# Patient Record
Sex: MALE | NOT HISPANIC OR LATINO | URBAN - METROPOLITAN AREA
[De-identification: names, ages, dates, MRNs, and addresses within clinical notes are randomized per-mention and may not be internally consistent; named-entity substitution may affect disease eponyms.]

---

## 2017-08-01 ENCOUNTER — CHARTING TRANS (OUTPATIENT)
Dept: NEPHROLOGY | Age: 72
End: 2017-08-01

## 2017-08-08 ENCOUNTER — CHARTING TRANS (OUTPATIENT)
Dept: NEPHROLOGY | Age: 72
End: 2017-08-08

## 2018-11-28 VITALS
WEIGHT: 243 LBS | OXYGEN SATURATION: 98 % | HEIGHT: 73 IN | SYSTOLIC BLOOD PRESSURE: 141 MMHG | HEART RATE: 71 BPM | DIASTOLIC BLOOD PRESSURE: 90 MMHG | BODY MASS INDEX: 32.2 KG/M2 | RESPIRATION RATE: 20 BRPM | TEMPERATURE: 97.8 F

## 2018-11-28 VITALS
OXYGEN SATURATION: 95 % | WEIGHT: 243 LBS | SYSTOLIC BLOOD PRESSURE: 140 MMHG | HEART RATE: 62 BPM | DIASTOLIC BLOOD PRESSURE: 73 MMHG | TEMPERATURE: 97.9 F | RESPIRATION RATE: 18 BRPM

## 2019-01-01 ENCOUNTER — HOSPITAL ENCOUNTER (INPATIENT)
Facility: HOSPITAL | Age: 74
LOS: 2 days | Discharge: HOME | DRG: 682 | End: 2019-01-01
Attending: EMERGENCY MEDICINE | Admitting: STUDENT IN AN ORGANIZED HEALTH CARE EDUCATION/TRAINING PROGRAM
Payer: MEDICARE

## 2019-01-01 ENCOUNTER — APPOINTMENT (OUTPATIENT)
Dept: GENERAL RADIOLOGY | Facility: HOSPITAL | Age: 74
DRG: 314 | End: 2019-01-01
Attending: EMERGENCY MEDICINE
Payer: MEDICARE

## 2019-01-01 ENCOUNTER — HOSPITAL ENCOUNTER (OUTPATIENT)
Facility: HOSPITAL | Age: 74
Setting detail: OBSERVATION
Discharge: HOME | End: 2019-01-01
Attending: EMERGENCY MEDICINE | Admitting: HOSPITALIST
Payer: MEDICARE

## 2019-01-01 ENCOUNTER — APPOINTMENT (OUTPATIENT)
Dept: CV DIAGNOSTICS | Facility: HOSPITAL | Age: 74
DRG: 314 | End: 2019-01-01
Attending: HOSPITALIST
Payer: MEDICARE

## 2019-01-01 ENCOUNTER — APPOINTMENT (OUTPATIENT)
Dept: GENERAL RADIOLOGY | Facility: HOSPITAL | Age: 74
DRG: 314 | End: 2019-01-01
Attending: NURSE PRACTITIONER
Payer: MEDICARE

## 2019-01-01 ENCOUNTER — APPOINTMENT (OUTPATIENT)
Dept: CT IMAGING | Facility: HOSPITAL | Age: 74
End: 2019-01-01
Attending: HOSPITALIST
Payer: MEDICARE

## 2019-01-01 ENCOUNTER — HOSPITAL ENCOUNTER (INPATIENT)
Dept: INTERVENTIONAL RADIOLOGY/VASCULAR | Facility: HOSPITAL | Age: 74
LOS: 2 days | Discharge: NURSING, REHAB OR LONG-TERM CARE | DRG: 260 | End: 2020-01-01
Attending: INTERNAL MEDICINE | Admitting: INTERNAL MEDICINE
Payer: MEDICARE

## 2019-01-01 ENCOUNTER — APPOINTMENT (OUTPATIENT)
Dept: MRI IMAGING | Facility: HOSPITAL | Age: 74
DRG: 314 | End: 2019-01-01
Attending: Other
Payer: MEDICARE

## 2019-01-01 ENCOUNTER — APPOINTMENT (OUTPATIENT)
Dept: GENERAL RADIOLOGY | Facility: HOSPITAL | Age: 74
DRG: 260 | End: 2019-01-01
Attending: INTERNAL MEDICINE
Payer: MEDICARE

## 2019-01-01 ENCOUNTER — APPOINTMENT (OUTPATIENT)
Dept: CT IMAGING | Facility: HOSPITAL | Age: 74
DRG: 314 | End: 2019-01-01
Attending: INTERNAL MEDICINE
Payer: MEDICARE

## 2019-01-01 ENCOUNTER — PATIENT OUTREACH (OUTPATIENT)
Dept: CASE MANAGEMENT | Age: 74
End: 2019-01-01

## 2019-01-01 ENCOUNTER — HOSPITAL ENCOUNTER (INPATIENT)
Facility: HOSPITAL | Age: 74
LOS: 4 days | Discharge: HOME | DRG: 291 | End: 2019-01-01
Attending: EMERGENCY MEDICINE | Admitting: HOSPITALIST
Payer: MEDICARE

## 2019-01-01 ENCOUNTER — HOSPITAL ENCOUNTER (EMERGENCY)
Facility: HOSPITAL | Age: 74
Discharge: HOME | End: 2019-01-01
Attending: EMERGENCY MEDICINE
Payer: MEDICARE

## 2019-01-01 ENCOUNTER — APPOINTMENT (OUTPATIENT)
Dept: GENERAL RADIOLOGY | Facility: HOSPITAL | Age: 74
End: 2019-01-01
Attending: EMERGENCY MEDICINE
Payer: MEDICARE

## 2019-01-01 ENCOUNTER — APPOINTMENT (OUTPATIENT)
Dept: MRI IMAGING | Facility: HOSPITAL | Age: 74
DRG: 314 | End: 2019-01-01
Attending: NURSE PRACTITIONER
Payer: MEDICARE

## 2019-01-01 ENCOUNTER — APPOINTMENT (OUTPATIENT)
Dept: CT IMAGING | Facility: HOSPITAL | Age: 74
DRG: 314 | End: 2019-01-01
Attending: HOSPITALIST
Payer: MEDICARE

## 2019-01-01 ENCOUNTER — APPOINTMENT (OUTPATIENT)
Dept: ULTRASOUND IMAGING | Facility: HOSPITAL | Age: 74
End: 2019-01-01
Attending: EMERGENCY MEDICINE
Payer: MEDICARE

## 2019-01-01 ENCOUNTER — ANESTHESIA (OUTPATIENT)
Dept: INTERVENTIONAL RADIOLOGY/VASCULAR | Facility: HOSPITAL | Age: 74
DRG: 260 | End: 2019-01-01
Payer: MEDICARE

## 2019-01-01 ENCOUNTER — APPOINTMENT (OUTPATIENT)
Dept: CT IMAGING | Facility: HOSPITAL | Age: 74
DRG: 314 | End: 2019-01-01
Attending: Other
Payer: MEDICARE

## 2019-01-01 ENCOUNTER — APPOINTMENT (OUTPATIENT)
Dept: GENERAL RADIOLOGY | Facility: HOSPITAL | Age: 74
DRG: 291 | End: 2019-01-01
Payer: MEDICARE

## 2019-01-01 ENCOUNTER — HOSPITAL ENCOUNTER (INPATIENT)
Facility: HOSPITAL | Age: 74
LOS: 6 days | Discharge: NURSING, REHAB OR LONG-TERM CARE | DRG: 314 | End: 2019-01-01
Attending: EMERGENCY MEDICINE | Admitting: HOSPITALIST
Payer: MEDICARE

## 2019-01-01 ENCOUNTER — APPOINTMENT (OUTPATIENT)
Dept: CT IMAGING | Facility: HOSPITAL | Age: 74
End: 2019-01-01
Attending: EMERGENCY MEDICINE
Payer: MEDICARE

## 2019-01-01 ENCOUNTER — APPOINTMENT (OUTPATIENT)
Dept: CV DIAGNOSTICS | Facility: HOSPITAL | Age: 74
DRG: 682 | End: 2019-01-01
Attending: INTERNAL MEDICINE
Payer: MEDICARE

## 2019-01-01 ENCOUNTER — APPOINTMENT (OUTPATIENT)
Dept: CV DIAGNOSTICS | Facility: HOSPITAL | Age: 74
DRG: 314 | End: 2019-01-01
Attending: NURSE PRACTITIONER
Payer: MEDICARE

## 2019-01-01 VITALS
DIASTOLIC BLOOD PRESSURE: 55 MMHG | WEIGHT: 235.88 LBS | HEIGHT: 73 IN | SYSTOLIC BLOOD PRESSURE: 94 MMHG | HEART RATE: 68 BPM | OXYGEN SATURATION: 97 % | BODY MASS INDEX: 31.26 KG/M2 | RESPIRATION RATE: 18 BRPM | TEMPERATURE: 98 F

## 2019-01-01 VITALS
TEMPERATURE: 98 F | DIASTOLIC BLOOD PRESSURE: 76 MMHG | BODY MASS INDEX: 31.47 KG/M2 | OXYGEN SATURATION: 97 % | HEIGHT: 73 IN | WEIGHT: 237.44 LBS | RESPIRATION RATE: 17 BRPM | HEART RATE: 67 BPM | SYSTOLIC BLOOD PRESSURE: 121 MMHG

## 2019-01-01 VITALS
TEMPERATURE: 98 F | HEIGHT: 73 IN | RESPIRATION RATE: 18 BRPM | HEART RATE: 60 BPM | WEIGHT: 262 LBS | OXYGEN SATURATION: 95 % | DIASTOLIC BLOOD PRESSURE: 74 MMHG | BODY MASS INDEX: 34.72 KG/M2 | SYSTOLIC BLOOD PRESSURE: 97 MMHG

## 2019-01-01 VITALS
DIASTOLIC BLOOD PRESSURE: 77 MMHG | TEMPERATURE: 98 F | HEIGHT: 73 IN | RESPIRATION RATE: 17 BRPM | OXYGEN SATURATION: 92 % | BODY MASS INDEX: 32.84 KG/M2 | WEIGHT: 247.81 LBS | SYSTOLIC BLOOD PRESSURE: 119 MMHG | HEART RATE: 65 BPM

## 2019-01-01 VITALS
BODY MASS INDEX: 35.12 KG/M2 | HEIGHT: 73 IN | RESPIRATION RATE: 21 BRPM | SYSTOLIC BLOOD PRESSURE: 138 MMHG | TEMPERATURE: 98 F | OXYGEN SATURATION: 94 % | HEART RATE: 91 BPM | DIASTOLIC BLOOD PRESSURE: 97 MMHG | WEIGHT: 265 LBS

## 2019-01-01 DIAGNOSIS — E87.5 HYPERKALEMIA: ICD-10-CM

## 2019-01-01 DIAGNOSIS — N18.9 ACUTE RENAL FAILURE SUPERIMPOSED ON CHRONIC KIDNEY DISEASE, UNSPECIFIED CKD STAGE, UNSPECIFIED ACUTE RENAL FAILURE TYPE (HCC): ICD-10-CM

## 2019-01-01 DIAGNOSIS — M54.2 NECK PAIN: Primary | ICD-10-CM

## 2019-01-01 DIAGNOSIS — I50.9 ACUTE ON CHRONIC CONGESTIVE HEART FAILURE, UNSPECIFIED HEART FAILURE TYPE (HCC): Primary | ICD-10-CM

## 2019-01-01 DIAGNOSIS — E86.0 DEHYDRATION: ICD-10-CM

## 2019-01-01 DIAGNOSIS — I48.91 ATRIAL FIBRILLATION, UNSPECIFIED TYPE (HCC): ICD-10-CM

## 2019-01-01 DIAGNOSIS — N18.9 ACUTE RENAL FAILURE SUPERIMPOSED ON CHRONIC KIDNEY DISEASE, UNSPECIFIED CKD STAGE, UNSPECIFIED ACUTE RENAL FAILURE TYPE (HCC): Primary | ICD-10-CM

## 2019-01-01 DIAGNOSIS — R77.8 ELEVATED TROPONIN: ICD-10-CM

## 2019-01-01 DIAGNOSIS — I73.9 PERIPHERAL VASCULAR DISEASE OF EXTREMITY (HCC): ICD-10-CM

## 2019-01-01 DIAGNOSIS — Z95.810 ICD (IMPLANTABLE CARDIOVERTER-DEFIBRILLATOR) IN PLACE: ICD-10-CM

## 2019-01-01 DIAGNOSIS — E87.70 HYPERVOLEMIA, UNSPECIFIED HYPERVOLEMIA TYPE: ICD-10-CM

## 2019-01-01 DIAGNOSIS — M79.605 PAIN OF LEFT LOWER EXTREMITY: Primary | ICD-10-CM

## 2019-01-01 DIAGNOSIS — I50.9 ACUTE ON CHRONIC CONGESTIVE HEART FAILURE, UNSPECIFIED HEART FAILURE TYPE (HCC): ICD-10-CM

## 2019-01-01 DIAGNOSIS — N17.9 ACUTE RENAL FAILURE SUPERIMPOSED ON CHRONIC KIDNEY DISEASE, UNSPECIFIED CKD STAGE, UNSPECIFIED ACUTE RENAL FAILURE TYPE (HCC): Primary | ICD-10-CM

## 2019-01-01 DIAGNOSIS — I48.0 PAROXYSMAL ATRIAL FIBRILLATION (HCC): ICD-10-CM

## 2019-01-01 DIAGNOSIS — N28.9 RENAL INSUFFICIENCY: ICD-10-CM

## 2019-01-01 DIAGNOSIS — N17.9 ACUTE RENAL FAILURE SUPERIMPOSED ON CHRONIC KIDNEY DISEASE, UNSPECIFIED CKD STAGE, UNSPECIFIED ACUTE RENAL FAILURE TYPE (HCC): ICD-10-CM

## 2019-01-01 DIAGNOSIS — R53.1 WEAKNESS GENERALIZED: ICD-10-CM

## 2019-01-01 DIAGNOSIS — I25.708 CORONARY ARTERY DISEASE INVOLVING CORONARY BYPASS GRAFT OF NATIVE HEART WITH OTHER FORMS OF ANGINA PECTORIS (HCC): ICD-10-CM

## 2019-01-01 DIAGNOSIS — Z02.9 ENCOUNTERS FOR UNSPECIFIED ADMINISTRATIVE PURPOSE: ICD-10-CM

## 2019-01-01 DIAGNOSIS — R31.0 GROSS HEMATURIA: ICD-10-CM

## 2019-01-01 DIAGNOSIS — R68.89 ALTERATION IN SELF-CARE ABILITY: ICD-10-CM

## 2019-01-01 LAB
ALBUMIN SERPL-MCNC: 3.5 G/DL (ref 3.4–5)
ALBUMIN/GLOB SERPL: 0.9 {RATIO} (ref 1–2)
ALP LIVER SERPL-CCNC: 94 U/L (ref 45–117)
ALT SERPL-CCNC: 18 U/L (ref 16–61)
ANION GAP SERPL CALC-SCNC: 5 MMOL/L (ref 0–18)
ANION GAP SERPL CALC-SCNC: 6 MMOL/L (ref 0–18)
ANION GAP SERPL CALC-SCNC: 6 MMOL/L (ref 0–18)
ANION GAP SERPL CALC-SCNC: 7 MMOL/L (ref 0–18)
ANION GAP SERPL CALC-SCNC: 9 MMOL/L (ref 0–18)
APTT PPP: 39.5 SECONDS (ref 25.4–36.1)
AST SERPL-CCNC: 15 U/L (ref 15–37)
ATRIAL RATE: 500 BPM
ATRIAL RATE: 78 BPM
BASOPHILS # BLD AUTO: 0.03 X10(3) UL (ref 0–0.2)
BASOPHILS NFR BLD AUTO: 0.4 %
BILIRUB SERPL-MCNC: 0.9 MG/DL (ref 0.1–2)
BUN BLD-MCNC: 48 MG/DL (ref 7–18)
BUN BLD-MCNC: 50 MG/DL (ref 7–18)
BUN BLD-MCNC: 50 MG/DL (ref 7–18)
BUN BLD-MCNC: 54 MG/DL (ref 7–18)
BUN BLD-MCNC: 55 MG/DL (ref 7–18)
BUN/CREAT SERPL: 17.8 (ref 10–20)
BUN/CREAT SERPL: 19.4 (ref 10–20)
BUN/CREAT SERPL: 20.1 (ref 10–20)
BUN/CREAT SERPL: 20.1 (ref 10–20)
BUN/CREAT SERPL: 21.2 (ref 10–20)
CALCIUM BLD-MCNC: 8.5 MG/DL (ref 8.5–10.1)
CALCIUM BLD-MCNC: 8.7 MG/DL (ref 8.5–10.1)
CALCIUM BLD-MCNC: 8.8 MG/DL (ref 8.5–10.1)
CALCIUM BLD-MCNC: 8.9 MG/DL (ref 8.5–10.1)
CALCIUM BLD-MCNC: 9 MG/DL (ref 8.5–10.1)
CHLORIDE SERPL-SCNC: 105 MMOL/L (ref 98–112)
CHLORIDE SERPL-SCNC: 106 MMOL/L (ref 98–112)
CHLORIDE SERPL-SCNC: 109 MMOL/L (ref 98–112)
CHLORIDE SERPL-SCNC: 111 MMOL/L (ref 98–112)
CHLORIDE SERPL-SCNC: 111 MMOL/L (ref 98–112)
CO2 SERPL-SCNC: 25 MMOL/L (ref 21–32)
CO2 SERPL-SCNC: 27 MMOL/L (ref 21–32)
CO2 SERPL-SCNC: 28 MMOL/L (ref 21–32)
CO2 SERPL-SCNC: 28 MMOL/L (ref 21–32)
CO2 SERPL-SCNC: 30 MMOL/L (ref 21–32)
CREAT BLD-MCNC: 2.49 MG/DL (ref 0.7–1.3)
CREAT BLD-MCNC: 2.58 MG/DL (ref 0.7–1.3)
CREAT BLD-MCNC: 2.59 MG/DL (ref 0.7–1.3)
CREAT BLD-MCNC: 2.68 MG/DL (ref 0.7–1.3)
CREAT BLD-MCNC: 2.7 MG/DL (ref 0.7–1.3)
DEPRECATED RDW RBC AUTO: 59.5 FL (ref 35.1–46.3)
DEPRECATED RDW RBC AUTO: 59.7 FL (ref 35.1–46.3)
EOSINOPHIL # BLD AUTO: 0.43 X10(3) UL (ref 0–0.7)
EOSINOPHIL NFR BLD AUTO: 5.5 %
ERYTHROCYTE [DISTWIDTH] IN BLOOD BY AUTOMATED COUNT: 17 % (ref 11–15)
ERYTHROCYTE [DISTWIDTH] IN BLOOD BY AUTOMATED COUNT: 17.4 % (ref 11–15)
GLOBULIN PLAS-MCNC: 3.9 G/DL (ref 2.8–4.4)
GLUCOSE BLD-MCNC: 103 MG/DL (ref 70–99)
GLUCOSE BLD-MCNC: 112 MG/DL (ref 70–99)
GLUCOSE BLD-MCNC: 86 MG/DL (ref 70–99)
GLUCOSE BLD-MCNC: 91 MG/DL (ref 70–99)
GLUCOSE BLD-MCNC: 93 MG/DL (ref 70–99)
HAV IGM SER QL: 2.1 MG/DL (ref 1.6–2.6)
HCT VFR BLD AUTO: 37 % (ref 39–53)
HCT VFR BLD AUTO: 39 % (ref 39–53)
HGB BLD-MCNC: 11.4 G/DL (ref 13–17.5)
HGB BLD-MCNC: 12.3 G/DL (ref 13–17.5)
IMM GRANULOCYTES # BLD AUTO: 0.02 X10(3) UL (ref 0–1)
IMM GRANULOCYTES NFR BLD: 0.3 %
INR BLD: 2.1 (ref 0.9–1.1)
INR BLD: 2.17 (ref 0.9–1.1)
INR BLD: 2.24 (ref 0.9–1.1)
INR BLD: 2.46 (ref 0.9–1.1)
INR BLD: 2.48 (ref 0.9–1.1)
LYMPHOCYTES # BLD AUTO: 0.54 X10(3) UL (ref 1–4)
LYMPHOCYTES NFR BLD AUTO: 6.9 %
M PROTEIN MFR SERPL ELPH: 7.4 G/DL (ref 6.4–8.2)
MCH RBC QN AUTO: 29.5 PG (ref 26–34)
MCH RBC QN AUTO: 29.8 PG (ref 26–34)
MCHC RBC AUTO-ENTMCNC: 30.8 G/DL (ref 31–37)
MCHC RBC AUTO-ENTMCNC: 31.5 G/DL (ref 31–37)
MCV RBC AUTO: 94.4 FL (ref 80–100)
MCV RBC AUTO: 95.9 FL (ref 80–100)
MONOCYTES # BLD AUTO: 0.53 X10(3) UL (ref 0.1–1)
MONOCYTES NFR BLD AUTO: 6.8 %
NEUTROPHILS # BLD AUTO: 6.23 X10 (3) UL (ref 1.5–7.7)
NEUTROPHILS # BLD AUTO: 6.23 X10(3) UL (ref 1.5–7.7)
NEUTROPHILS NFR BLD AUTO: 80.1 %
NT-PROBNP SERPL-MCNC: 7469 PG/ML (ref ?–125)
OSMOLALITY SERPL CALC.SUM OF ELEC: 304 MOSM/KG (ref 275–295)
OSMOLALITY SERPL CALC.SUM OF ELEC: 309 MOSM/KG (ref 275–295)
OSMOLALITY SERPL CALC.SUM OF ELEC: 313 MOSM/KG (ref 275–295)
P AXIS: 177 DEGREES
P-R INTERVAL: 198 MS
P-R INTERVAL: 268 MS
PLATELET # BLD AUTO: 142 10(3)UL (ref 150–450)
PLATELET # BLD AUTO: 154 10(3)UL (ref 150–450)
POTASSIUM SERPL-SCNC: 3.8 MMOL/L (ref 3.5–5.1)
POTASSIUM SERPL-SCNC: 3.9 MMOL/L (ref 3.5–5.1)
POTASSIUM SERPL-SCNC: 4.1 MMOL/L (ref 3.5–5.1)
POTASSIUM SERPL-SCNC: 4.1 MMOL/L (ref 3.5–5.1)
POTASSIUM SERPL-SCNC: 4.3 MMOL/L (ref 3.5–5.1)
PSA SERPL DL<=0.01 NG/ML-MCNC: 24.9 SECONDS (ref 12.5–14.7)
PSA SERPL DL<=0.01 NG/ML-MCNC: 25.5 SECONDS (ref 12.5–14.7)
PSA SERPL DL<=0.01 NG/ML-MCNC: 26.2 SECONDS (ref 12.5–14.7)
PSA SERPL DL<=0.01 NG/ML-MCNC: 28.3 SECONDS (ref 12.5–14.7)
PSA SERPL DL<=0.01 NG/ML-MCNC: 28.5 SECONDS (ref 12.5–14.7)
Q-T INTERVAL: 460 MS
Q-T INTERVAL: 488 MS
QRS DURATION: 144 MS
QRS DURATION: 146 MS
QTC CALCULATION (BEZET): 524 MS
QTC CALCULATION (BEZET): 527 MS
R AXIS: -4 DEGREES
R AXIS: -9 DEGREES
RBC # BLD AUTO: 3.86 X10(6)UL (ref 3.8–5.8)
RBC # BLD AUTO: 4.13 X10(6)UL (ref 3.8–5.8)
SODIUM SERPL-SCNC: 140 MMOL/L (ref 136–145)
SODIUM SERPL-SCNC: 142 MMOL/L (ref 136–145)
SODIUM SERPL-SCNC: 143 MMOL/L (ref 136–145)
SODIUM SERPL-SCNC: 143 MMOL/L (ref 136–145)
SODIUM SERPL-SCNC: 145 MMOL/L (ref 136–145)
T AXIS: 148 DEGREES
T AXIS: 165 DEGREES
TROPONIN I SERPL-MCNC: 0.07 NG/ML (ref ?–0.04)
TROPONIN I SERPL-MCNC: 0.07 NG/ML (ref ?–0.04)
VENTRICULAR RATE: 70 BPM
VENTRICULAR RATE: 78 BPM
WBC # BLD AUTO: 6.5 X10(3) UL (ref 4–11)
WBC # BLD AUTO: 7.8 X10(3) UL (ref 4–11)

## 2019-01-01 PROCEDURE — 99223 1ST HOSP IP/OBS HIGH 75: CPT | Performed by: INTERNAL MEDICINE

## 2019-01-01 PROCEDURE — 99291 CRITICAL CARE FIRST HOUR: CPT | Performed by: OTHER

## 2019-01-01 PROCEDURE — 70498 CT ANGIOGRAPHY NECK: CPT | Performed by: OTHER

## 2019-01-01 PROCEDURE — 72148 MRI LUMBAR SPINE W/O DYE: CPT | Performed by: NURSE PRACTITIONER

## 2019-01-01 PROCEDURE — B246ZZ4 ULTRASONOGRAPHY OF RIGHT AND LEFT HEART, TRANSESOPHAGEAL: ICD-10-PCS | Performed by: INTERNAL MEDICINE

## 2019-01-01 PROCEDURE — 73560 X-RAY EXAM OF KNEE 1 OR 2: CPT | Performed by: NURSE PRACTITIONER

## 2019-01-01 PROCEDURE — 93971 EXTREMITY STUDY: CPT | Performed by: EMERGENCY MEDICINE

## 2019-01-01 PROCEDURE — 72146 MRI CHEST SPINE W/O DYE: CPT | Performed by: NURSE PRACTITIONER

## 2019-01-01 PROCEDURE — 70496 CT ANGIOGRAPHY HEAD: CPT | Performed by: OTHER

## 2019-01-01 PROCEDURE — B547ZZA ULTRASONOGRAPHY OF LEFT SUBCLAVIAN VEIN, GUIDANCE: ICD-10-PCS | Performed by: HOSPITALIST

## 2019-01-01 PROCEDURE — 99285 EMERGENCY DEPT VISIT HI MDM: CPT

## 2019-01-01 PROCEDURE — 99232 SBSQ HOSP IP/OBS MODERATE 35: CPT | Performed by: INTERNAL MEDICINE

## 2019-01-01 PROCEDURE — 99233 SBSQ HOSP IP/OBS HIGH 50: CPT | Performed by: HOSPITALIST

## 2019-01-01 PROCEDURE — 99217 OBSERVATION CARE DISCHARGE: CPT | Performed by: HOSPITALIST

## 2019-01-01 PROCEDURE — 99233 SBSQ HOSP IP/OBS HIGH 50: CPT | Performed by: OTHER

## 2019-01-01 PROCEDURE — 99291 CRITICAL CARE FIRST HOUR: CPT | Performed by: HOSPITALIST

## 2019-01-01 PROCEDURE — B24BZZ4 ULTRASONOGRAPHY OF HEART WITH AORTA, TRANSESOPHAGEAL: ICD-10-PCS | Performed by: HOSPITALIST

## 2019-01-01 PROCEDURE — 99232 SBSQ HOSP IP/OBS MODERATE 35: CPT | Performed by: HOSPITALIST

## 2019-01-01 PROCEDURE — 70551 MRI BRAIN STEM W/O DYE: CPT | Performed by: OTHER

## 2019-01-01 PROCEDURE — 74176 CT ABD & PELVIS W/O CONTRAST: CPT | Performed by: INTERNAL MEDICINE

## 2019-01-01 PROCEDURE — 93307 TTE W/O DOPPLER COMPLETE: CPT | Performed by: HOSPITALIST

## 2019-01-01 PROCEDURE — 99284 EMERGENCY DEPT VISIT MOD MDM: CPT

## 2019-01-01 PROCEDURE — 0042T CT STROKE(DAWN BRAIN)CTA BRAIN/CTA NECK+PERF(CPT=70496/70498/0042T): CPT | Performed by: OTHER

## 2019-01-01 PROCEDURE — 99223 1ST HOSP IP/OBS HIGH 75: CPT | Performed by: OTHER

## 2019-01-01 PROCEDURE — 99233 SBSQ HOSP IP/OBS HIGH 50: CPT | Performed by: INTERNAL MEDICINE

## 2019-01-01 PROCEDURE — 72141 MRI NECK SPINE W/O DYE: CPT | Performed by: NURSE PRACTITIONER

## 2019-01-01 PROCEDURE — 70450 CT HEAD/BRAIN W/O DYE: CPT | Performed by: EMERGENCY MEDICINE

## 2019-01-01 PROCEDURE — 0JPT0PZ REMOVAL OF CARDIAC RHYTHM RELATED DEVICE FROM TRUNK SUBCUTANEOUS TISSUE AND FASCIA, OPEN APPROACH: ICD-10-PCS | Performed by: INTERNAL MEDICINE

## 2019-01-01 PROCEDURE — 99223 1ST HOSP IP/OBS HIGH 75: CPT | Performed by: HOSPITALIST

## 2019-01-01 PROCEDURE — 93325 DOPPLER ECHO COLOR FLOW MAPG: CPT | Performed by: NURSE PRACTITIONER

## 2019-01-01 PROCEDURE — 71045 X-RAY EXAM CHEST 1 VIEW: CPT | Performed by: EMERGENCY MEDICINE

## 2019-01-01 PROCEDURE — 99232 SBSQ HOSP IP/OBS MODERATE 35: CPT | Performed by: NURSE PRACTITIONER

## 2019-01-01 PROCEDURE — 71250 CT THORAX DX C-: CPT | Performed by: INTERNAL MEDICINE

## 2019-01-01 PROCEDURE — 99239 HOSP IP/OBS DSCHRG MGMT >30: CPT | Performed by: HOSPITALIST

## 2019-01-01 PROCEDURE — 1111F DSCHRG MED/CURRENT MED MERGE: CPT

## 2019-01-01 PROCEDURE — 93306 TTE W/DOPPLER COMPLETE: CPT | Performed by: INTERNAL MEDICINE

## 2019-01-01 PROCEDURE — 71045 X-RAY EXAM CHEST 1 VIEW: CPT | Performed by: INTERNAL MEDICINE

## 2019-01-01 PROCEDURE — 72125 CT NECK SPINE W/O DYE: CPT | Performed by: EMERGENCY MEDICINE

## 2019-01-01 PROCEDURE — 02HV33Z INSERTION OF INFUSION DEVICE INTO SUPERIOR VENA CAVA, PERCUTANEOUS APPROACH: ICD-10-PCS | Performed by: INTERNAL MEDICINE

## 2019-01-01 PROCEDURE — 70450 CT HEAD/BRAIN W/O DYE: CPT | Performed by: HOSPITALIST

## 2019-01-01 PROCEDURE — 99226 SUBSEQUENT OBSERVATION CARE: CPT | Performed by: HOSPITALIST

## 2019-01-01 PROCEDURE — 5A09457 ASSISTANCE WITH RESPIRATORY VENTILATION, 24-96 CONSECUTIVE HOURS, CONTINUOUS POSITIVE AIRWAY PRESSURE: ICD-10-PCS | Performed by: HOSPITALIST

## 2019-01-01 PROCEDURE — 99220 INITIAL OBSERVATION CARE,LEVL III: CPT | Performed by: HOSPITALIST

## 2019-01-01 PROCEDURE — 93320 DOPPLER ECHO COMPLETE: CPT | Performed by: NURSE PRACTITIONER

## 2019-01-01 PROCEDURE — 99222 1ST HOSP IP/OBS MODERATE 55: CPT | Performed by: NURSE PRACTITIONER

## 2019-01-01 PROCEDURE — 02PA3MZ REMOVAL OF CARDIAC LEAD FROM HEART, PERCUTANEOUS APPROACH: ICD-10-PCS | Performed by: INTERNAL MEDICINE

## 2019-01-01 PROCEDURE — 05H633Z INSERTION OF INFUSION DEVICE INTO LEFT SUBCLAVIAN VEIN, PERCUTANEOUS APPROACH: ICD-10-PCS | Performed by: HOSPITALIST

## 2019-01-01 RX ORDER — ACETAMINOPHEN 325 MG/1
650 TABLET ORAL EVERY 6 HOURS PRN
Status: DISCONTINUED | OUTPATIENT
Start: 2019-01-01 | End: 2019-01-01

## 2019-01-01 RX ORDER — DOCUSATE SODIUM 100 MG/1
100 CAPSULE, LIQUID FILLED ORAL 2 TIMES DAILY
Status: DISCONTINUED | OUTPATIENT
Start: 2019-01-01 | End: 2019-01-01

## 2019-01-01 RX ORDER — ATORVASTATIN CALCIUM 80 MG/1
80 TABLET, FILM COATED ORAL DAILY
Status: DISCONTINUED | OUTPATIENT
Start: 2019-01-01 | End: 2019-01-01

## 2019-01-01 RX ORDER — METOCLOPRAMIDE HYDROCHLORIDE 5 MG/ML
5 INJECTION INTRAMUSCULAR; INTRAVENOUS EVERY 8 HOURS PRN
Status: DISCONTINUED | OUTPATIENT
Start: 2019-01-01 | End: 2019-01-01

## 2019-01-01 RX ORDER — HYDROCODONE BITARTRATE AND ACETAMINOPHEN 5; 325 MG/1; MG/1
1 TABLET ORAL EVERY 4 HOURS PRN
Status: DISCONTINUED | OUTPATIENT
Start: 2019-01-01 | End: 2019-01-01

## 2019-01-01 RX ORDER — FLUTICASONE PROPIONATE 50 MCG
2 SPRAY, SUSPENSION (ML) NASAL DAILY
Status: DISCONTINUED | OUTPATIENT
Start: 2019-01-01 | End: 2019-01-01

## 2019-01-01 RX ORDER — ASPIRIN 81 MG/1
81 TABLET ORAL DAILY
Status: DISCONTINUED | OUTPATIENT
Start: 2019-01-01 | End: 2019-01-01

## 2019-01-01 RX ORDER — FUROSEMIDE 40 MG/1
40 TABLET ORAL
Status: DISCONTINUED | OUTPATIENT
Start: 2019-01-01 | End: 2019-01-01

## 2019-01-01 RX ORDER — MORPHINE SULFATE 4 MG/ML
4 INJECTION, SOLUTION INTRAMUSCULAR; INTRAVENOUS EVERY 2 HOUR PRN
Status: DISCONTINUED | OUTPATIENT
Start: 2019-01-01 | End: 2019-01-01

## 2019-01-01 RX ORDER — HYDROCODONE BITARTRATE AND ACETAMINOPHEN 5; 325 MG/1; MG/1
2 TABLET ORAL EVERY 6 HOURS PRN
Status: DISCONTINUED | OUTPATIENT
Start: 2019-01-01 | End: 2019-01-01

## 2019-01-01 RX ORDER — ISOSORBIDE MONONITRATE 60 MG/1
60 TABLET, EXTENDED RELEASE ORAL DAILY
Status: DISCONTINUED | OUTPATIENT
Start: 2019-01-01 | End: 2019-01-01

## 2019-01-01 RX ORDER — METHYLPREDNISOLONE 4 MG/1
4 TABLET ORAL
Status: DISCONTINUED | OUTPATIENT
Start: 2019-01-01 | End: 2019-01-01

## 2019-01-01 RX ORDER — CEFAZOLIN SODIUM 1 G/3ML
INJECTION, POWDER, FOR SOLUTION INTRAMUSCULAR; INTRAVENOUS AS NEEDED
Status: DISCONTINUED | OUTPATIENT
Start: 2019-01-01 | End: 2019-01-01 | Stop reason: SURG

## 2019-01-01 RX ORDER — BISACODYL 10 MG
10 SUPPOSITORY, RECTAL RECTAL
COMMUNITY

## 2019-01-01 RX ORDER — POLYETHYLENE GLYCOL 3350 17 G/17G
17 POWDER, FOR SOLUTION ORAL DAILY PRN
Status: DISCONTINUED | OUTPATIENT
Start: 2019-01-01 | End: 2019-01-01

## 2019-01-01 RX ORDER — PHENYLEPHRINE HCL IN 0.9% NACL 50MG/250ML
PLASTIC BAG, INJECTION (ML) INTRAVENOUS CONTINUOUS PRN
Status: DISCONTINUED | OUTPATIENT
Start: 2019-01-01 | End: 2019-01-01

## 2019-01-01 RX ORDER — FUROSEMIDE 80 MG
80 TABLET ORAL
Status: DISCONTINUED | OUTPATIENT
Start: 2019-01-01 | End: 2019-01-01

## 2019-01-01 RX ORDER — ALLOPURINOL 100 MG/1
200 TABLET ORAL DAILY
COMMUNITY

## 2019-01-01 RX ORDER — METOPROLOL SUCCINATE 50 MG/1
75 TABLET, EXTENDED RELEASE ORAL
Qty: 60 TABLET | Refills: 0 | Status: ON HOLD | OUTPATIENT
Start: 2019-01-01 | End: 2020-01-01

## 2019-01-01 RX ORDER — MAGNESIUM OXIDE 420 MG
420 TABLET ORAL 2 TIMES DAILY
Status: ON HOLD | COMMUNITY
End: 2019-01-01

## 2019-01-01 RX ORDER — PANTOPRAZOLE SODIUM 20 MG/1
20 TABLET, DELAYED RELEASE ORAL
Status: DISCONTINUED | OUTPATIENT
Start: 2019-01-01 | End: 2019-01-01

## 2019-01-01 RX ORDER — ALLOPURINOL 100 MG/1
100 TABLET ORAL 2 TIMES DAILY
Status: DISCONTINUED | OUTPATIENT
Start: 2019-01-01 | End: 2019-01-01

## 2019-01-01 RX ORDER — FUROSEMIDE 40 MG/1
80 TABLET ORAL 2 TIMES DAILY
Qty: 120 TABLET | Refills: 11 | Status: SHIPPED | OUTPATIENT
Start: 2019-01-01 | End: 2019-01-01

## 2019-01-01 RX ORDER — METHYLPREDNISOLONE 4 MG/1
8 TABLET ORAL
Status: DISCONTINUED | OUTPATIENT
Start: 2019-01-01 | End: 2019-01-01

## 2019-01-01 RX ORDER — PHENYLEPHRINE HCL 10 MG/ML
VIAL (ML) INJECTION AS NEEDED
Status: DISCONTINUED | OUTPATIENT
Start: 2019-01-01 | End: 2019-01-01 | Stop reason: SURG

## 2019-01-01 RX ORDER — NALOXONE HYDROCHLORIDE 0.4 MG/ML
80 INJECTION, SOLUTION INTRAMUSCULAR; INTRAVENOUS; SUBCUTANEOUS AS NEEDED
Status: ACTIVE | OUTPATIENT
Start: 2019-01-01 | End: 2019-01-01

## 2019-01-01 RX ORDER — METOPROLOL SUCCINATE 50 MG/1
50 TABLET, EXTENDED RELEASE ORAL
Status: DISCONTINUED | OUTPATIENT
Start: 2019-01-01 | End: 2019-01-01

## 2019-01-01 RX ORDER — ONDANSETRON 2 MG/ML
4 INJECTION INTRAMUSCULAR; INTRAVENOUS AS NEEDED
Status: ACTIVE | OUTPATIENT
Start: 2019-01-01 | End: 2019-01-01

## 2019-01-01 RX ORDER — CARVEDILOL 12.5 MG/1
12.5 TABLET ORAL 2 TIMES DAILY WITH MEALS
Status: ON HOLD | COMMUNITY
End: 2019-01-01

## 2019-01-01 RX ORDER — WARFARIN SODIUM 2 MG/1
2 TABLET ORAL NIGHTLY
Status: ON HOLD | COMMUNITY
End: 2019-01-01

## 2019-01-01 RX ORDER — HEPARIN SODIUM,PORCINE 180/MG
5000 POWDER (GRAM) MISCELLANEOUS 3 TIMES DAILY
Status: ON HOLD | COMMUNITY
End: 2019-01-01

## 2019-01-01 RX ORDER — METOPROLOL SUCCINATE 25 MG/1
25 TABLET, EXTENDED RELEASE ORAL
Status: DISCONTINUED | OUTPATIENT
Start: 2019-01-01 | End: 2019-01-01

## 2019-01-01 RX ORDER — GABAPENTIN 100 MG/1
100 CAPSULE ORAL 3 TIMES DAILY
Status: DISCONTINUED | OUTPATIENT
Start: 2019-01-01 | End: 2019-01-01

## 2019-01-01 RX ORDER — ACETAMINOPHEN 325 MG/1
650 TABLET ORAL EVERY 4 HOURS PRN
COMMUNITY

## 2019-01-01 RX ORDER — POLYETHYLENE GLYCOL 3350 17 G/17G
17 POWDER, FOR SOLUTION ORAL 2 TIMES DAILY
COMMUNITY
End: 2019-01-01

## 2019-01-01 RX ORDER — ALLOPURINOL 100 MG/1
200 TABLET ORAL DAILY
Status: DISCONTINUED | OUTPATIENT
Start: 2019-01-01 | End: 2020-01-01

## 2019-01-01 RX ORDER — ASPIRIN 325 MG
325 TABLET ORAL DAILY
Status: DISCONTINUED | OUTPATIENT
Start: 2019-01-01 | End: 2019-01-01 | Stop reason: SDUPTHER

## 2019-01-01 RX ORDER — CALCIUM CARBONATE 200(500)MG
1000 TABLET,CHEWABLE ORAL 3 TIMES DAILY PRN
Status: DISCONTINUED | OUTPATIENT
Start: 2019-01-01 | End: 2019-01-01

## 2019-01-01 RX ORDER — METOPROLOL TARTRATE 5 MG/5ML
5 INJECTION INTRAVENOUS EVERY 6 HOURS
Status: DISCONTINUED | OUTPATIENT
Start: 2019-01-01 | End: 2019-01-01

## 2019-01-01 RX ORDER — ALBUTEROL SULFATE 90 UG/1
2 AEROSOL, METERED RESPIRATORY (INHALATION) 2 TIMES DAILY
Status: DISCONTINUED | OUTPATIENT
Start: 2019-01-01 | End: 2019-01-01

## 2019-01-01 RX ORDER — HYDROMORPHONE HYDROCHLORIDE 1 MG/ML
0.5 INJECTION, SOLUTION INTRAMUSCULAR; INTRAVENOUS; SUBCUTANEOUS EVERY 30 MIN PRN
Status: DISCONTINUED | OUTPATIENT
Start: 2019-01-01 | End: 2019-01-01

## 2019-01-01 RX ORDER — METOPROLOL SUCCINATE 25 MG/1
25 TABLET, EXTENDED RELEASE ORAL ONCE
Status: COMPLETED | OUTPATIENT
Start: 2019-01-01 | End: 2019-01-01

## 2019-01-01 RX ORDER — BACITRACIN 50000 [USP'U]/1
INJECTION, POWDER, LYOPHILIZED, FOR SOLUTION INTRAMUSCULAR
Status: COMPLETED
Start: 2019-01-01 | End: 2019-01-01

## 2019-01-01 RX ORDER — TRAMADOL HYDROCHLORIDE 50 MG/1
50 TABLET ORAL 2 TIMES DAILY PRN
Status: DISCONTINUED | OUTPATIENT
Start: 2019-01-01 | End: 2019-01-01

## 2019-01-01 RX ORDER — DOCUSATE SODIUM 100 MG/1
100 CAPSULE, LIQUID FILLED ORAL 2 TIMES DAILY PRN
COMMUNITY

## 2019-01-01 RX ORDER — ACETAMINOPHEN 325 MG/1
650 TABLET ORAL EVERY 4 HOURS PRN
Status: DISCONTINUED | OUTPATIENT
Start: 2019-01-01 | End: 2019-01-01

## 2019-01-01 RX ORDER — FUROSEMIDE 10 MG/ML
40 INJECTION INTRAMUSCULAR; INTRAVENOUS ONCE
Status: COMPLETED | OUTPATIENT
Start: 2019-01-01 | End: 2019-01-01

## 2019-01-01 RX ORDER — METOPROLOL SUCCINATE 25 MG/1
25 TABLET, EXTENDED RELEASE ORAL DAILY
Status: ON HOLD | COMMUNITY
End: 2019-01-01

## 2019-01-01 RX ORDER — SODIUM CHLORIDE 9 MG/ML
INJECTION, SOLUTION INTRAVENOUS ONCE
Status: COMPLETED | OUTPATIENT
Start: 2019-01-01 | End: 2019-01-01

## 2019-01-01 RX ORDER — ONDANSETRON 2 MG/ML
4 INJECTION INTRAMUSCULAR; INTRAVENOUS EVERY 6 HOURS PRN
Status: DISCONTINUED | OUTPATIENT
Start: 2019-01-01 | End: 2019-01-01

## 2019-01-01 RX ORDER — SODIUM POLYSTYRENE SULFONATE 4.1 MEQ/G
15 POWDER, FOR SUSPENSION ORAL; RECTAL ONCE
Status: COMPLETED | OUTPATIENT
Start: 2019-01-01 | End: 2019-01-01

## 2019-01-01 RX ORDER — MIDAZOLAM HYDROCHLORIDE 1 MG/ML
INJECTION INTRAMUSCULAR; INTRAVENOUS
Status: COMPLETED | OUTPATIENT
Start: 2019-01-01 | End: 2019-01-01

## 2019-01-01 RX ORDER — METOCLOPRAMIDE HYDROCHLORIDE 5 MG/ML
10 INJECTION INTRAMUSCULAR; INTRAVENOUS EVERY 8 HOURS PRN
Status: DISCONTINUED | OUTPATIENT
Start: 2019-01-01 | End: 2019-01-01

## 2019-01-01 RX ORDER — FAMOTIDINE 20 MG/1
20 TABLET ORAL DAILY
Status: DISCONTINUED | OUTPATIENT
Start: 2019-01-01 | End: 2019-01-01

## 2019-01-01 RX ORDER — WARFARIN SODIUM 2 MG/1
4 TABLET ORAL
Status: COMPLETED | OUTPATIENT
Start: 2019-01-01 | End: 2019-01-01

## 2019-01-01 RX ORDER — CYCLOBENZAPRINE HCL 10 MG
10 TABLET ORAL 3 TIMES DAILY PRN
Qty: 30 TABLET | Refills: 0 | Status: SHIPPED | OUTPATIENT
Start: 2019-01-01

## 2019-01-01 RX ORDER — FUROSEMIDE 40 MG/1
40 TABLET ORAL
Status: ON HOLD | COMMUNITY
End: 2019-01-01

## 2019-01-01 RX ORDER — ACETAMINOPHEN 650 MG/1
650 SUPPOSITORY RECTAL EVERY 4 HOURS PRN
Status: DISCONTINUED | OUTPATIENT
Start: 2019-01-01 | End: 2019-01-01

## 2019-01-01 RX ORDER — CALCIUM CARBONATE 200(500)MG
500 TABLET,CHEWABLE ORAL 3 TIMES DAILY PRN
Status: DISCONTINUED | OUTPATIENT
Start: 2019-01-01 | End: 2019-01-01

## 2019-01-01 RX ORDER — HYDRALAZINE HYDROCHLORIDE 25 MG/1
25 TABLET, FILM COATED ORAL EVERY 8 HOURS SCHEDULED
Qty: 90 TABLET | Refills: 1 | Status: SHIPPED | OUTPATIENT
Start: 2019-01-01 | End: 2019-01-01

## 2019-01-01 RX ORDER — ATORVASTATIN CALCIUM 80 MG/1
80 TABLET, FILM COATED ORAL NIGHTLY
Status: DISCONTINUED | OUTPATIENT
Start: 2019-01-01 | End: 2019-01-01

## 2019-01-01 RX ORDER — ALBUTEROL SULFATE 90 UG/1
1 AEROSOL, METERED RESPIRATORY (INHALATION) 2 TIMES DAILY
Status: DISCONTINUED | OUTPATIENT
Start: 2019-01-01 | End: 2019-01-01

## 2019-01-01 RX ORDER — NITROGLYCERIN 0.4 MG/1
0.4 TABLET SUBLINGUAL EVERY 5 MIN PRN
COMMUNITY
End: 2019-01-01

## 2019-01-01 RX ORDER — SODIUM CHLORIDE 9 MG/ML
INJECTION, SOLUTION INTRAVENOUS
Status: DISCONTINUED | OUTPATIENT
Start: 2019-01-01 | End: 2019-01-01 | Stop reason: HOSPADM

## 2019-01-01 RX ORDER — ALFUZOSIN HYDROCHLORIDE 10 MG/1
10 TABLET, EXTENDED RELEASE ORAL
Status: DISCONTINUED | OUTPATIENT
Start: 2019-01-01 | End: 2019-01-01

## 2019-01-01 RX ORDER — SODIUM CHLORIDE 9 MG/ML
INJECTION, SOLUTION INTRAVENOUS CONTINUOUS PRN
Status: DISCONTINUED | OUTPATIENT
Start: 2019-01-01 | End: 2019-01-01 | Stop reason: SURG

## 2019-01-01 RX ORDER — ONDANSETRON 2 MG/ML
INJECTION INTRAMUSCULAR; INTRAVENOUS
Status: COMPLETED
Start: 2019-01-01 | End: 2019-01-01

## 2019-01-01 RX ORDER — MIDAZOLAM HYDROCHLORIDE 1 MG/ML
INJECTION INTRAMUSCULAR; INTRAVENOUS
Status: DISPENSED
Start: 2019-01-01 | End: 2019-01-01

## 2019-01-01 RX ORDER — ALFUZOSIN HYDROCHLORIDE 10 MG/1
10 TABLET, EXTENDED RELEASE ORAL NIGHTLY
Status: DISCONTINUED | OUTPATIENT
Start: 2019-01-01 | End: 2019-01-01

## 2019-01-01 RX ORDER — LABETALOL HYDROCHLORIDE 5 MG/ML
10 INJECTION, SOLUTION INTRAVENOUS EVERY 10 MIN PRN
Status: DISCONTINUED | OUTPATIENT
Start: 2019-01-01 | End: 2019-01-01

## 2019-01-01 RX ORDER — TRAMADOL HYDROCHLORIDE 50 MG/1
50 TABLET ORAL 2 TIMES DAILY PRN
Status: ON HOLD | COMMUNITY
End: 2019-01-01

## 2019-01-01 RX ORDER — FINASTERIDE 5 MG/1
5 TABLET, FILM COATED ORAL NIGHTLY
Status: DISCONTINUED | OUTPATIENT
Start: 2019-01-01 | End: 2019-01-01

## 2019-01-01 RX ORDER — BISACODYL 10 MG
10 SUPPOSITORY, RECTAL RECTAL
Status: DISCONTINUED | OUTPATIENT
Start: 2019-01-01 | End: 2019-01-01

## 2019-01-01 RX ORDER — MORPHINE SULFATE 4 MG/ML
2 INJECTION, SOLUTION INTRAMUSCULAR; INTRAVENOUS EVERY 2 HOUR PRN
Status: DISCONTINUED | OUTPATIENT
Start: 2019-01-01 | End: 2019-01-01

## 2019-01-01 RX ORDER — WARFARIN SODIUM 2 MG/1
4 TABLET ORAL
Status: DISCONTINUED | OUTPATIENT
Start: 2019-01-01 | End: 2019-01-01 | Stop reason: DRUGHIGH

## 2019-01-01 RX ORDER — HYDROCODONE BITARTRATE AND ACETAMINOPHEN 5; 325 MG/1; MG/1
2 TABLET ORAL EVERY 4 HOURS PRN
Status: DISCONTINUED | OUTPATIENT
Start: 2019-01-01 | End: 2019-01-01

## 2019-01-01 RX ORDER — METOPROLOL SUCCINATE 50 MG/1
50 TABLET, EXTENDED RELEASE ORAL
Qty: 60 TABLET | Refills: 3 | Status: ON HOLD | OUTPATIENT
Start: 2019-01-01 | End: 2019-01-01

## 2019-01-01 RX ORDER — GABAPENTIN 300 MG/1
300 CAPSULE ORAL 3 TIMES DAILY
Status: DISCONTINUED | OUTPATIENT
Start: 2019-01-01 | End: 2020-01-01

## 2019-01-01 RX ORDER — SODIUM CHLORIDE 9 MG/ML
INJECTION, SOLUTION INTRAVENOUS CONTINUOUS
Status: DISCONTINUED | OUTPATIENT
Start: 2019-01-01 | End: 2019-01-01

## 2019-01-01 RX ORDER — CARVEDILOL 12.5 MG/1
12.5 TABLET ORAL 2 TIMES DAILY WITH MEALS
Status: DISCONTINUED | OUTPATIENT
Start: 2019-01-01 | End: 2019-01-01

## 2019-01-01 RX ORDER — HEPARIN SODIUM,PORCINE 180/MG
5000 POWDER (GRAM) MISCELLANEOUS 3 TIMES DAILY
Refills: 0 | Status: SHIPPED | COMMUNITY
Start: 2020-01-01 | End: 2020-01-01

## 2019-01-01 RX ORDER — ALFUZOSIN HYDROCHLORIDE 10 MG/1
10 TABLET, EXTENDED RELEASE ORAL
Status: DISCONTINUED | OUTPATIENT
Start: 2019-01-01 | End: 2020-01-01

## 2019-01-01 RX ORDER — POTASSIUM CHLORIDE 20 MEQ/1
40 TABLET, EXTENDED RELEASE ORAL ONCE
Status: COMPLETED | OUTPATIENT
Start: 2019-01-01 | End: 2019-01-01

## 2019-01-01 RX ORDER — ASPIRIN 300 MG
300 SUPPOSITORY, RECTAL RECTAL DAILY
Status: DISCONTINUED | OUTPATIENT
Start: 2019-01-01 | End: 2019-01-01

## 2019-01-01 RX ORDER — SODIUM CHLORIDE 9 MG/ML
INJECTION, SOLUTION INTRAVENOUS CONTINUOUS
Status: ACTIVE | OUTPATIENT
Start: 2019-01-01 | End: 2019-01-01

## 2019-01-01 RX ORDER — LIDOCAINE HYDROCHLORIDE 10 MG/ML
INJECTION, SOLUTION EPIDURAL; INFILTRATION; INTRACAUDAL; PERINEURAL AS NEEDED
Status: DISCONTINUED | OUTPATIENT
Start: 2019-01-01 | End: 2019-01-01 | Stop reason: SURG

## 2019-01-01 RX ORDER — HYDROCODONE BITARTRATE AND ACETAMINOPHEN 5; 325 MG/1; MG/1
1-2 TABLET ORAL EVERY 6 HOURS PRN
Status: DISCONTINUED | OUTPATIENT
Start: 2019-01-01 | End: 2019-01-01 | Stop reason: SDUPTHER

## 2019-01-01 RX ORDER — METOPROLOL SUCCINATE 25 MG/1
25 TABLET, EXTENDED RELEASE ORAL
Qty: 60 TABLET | Refills: 11 | Status: ON HOLD | OUTPATIENT
Start: 2019-01-01 | End: 2019-01-01

## 2019-01-01 RX ORDER — POTASSIUM CHLORIDE 20 MEQ/1
20 TABLET, EXTENDED RELEASE ORAL ONCE
Status: COMPLETED | OUTPATIENT
Start: 2019-01-01 | End: 2019-01-01

## 2019-01-01 RX ORDER — HYDROCODONE BITARTRATE AND ACETAMINOPHEN 5; 325 MG/1; MG/1
1 TABLET ORAL EVERY 6 HOURS PRN
Status: DISCONTINUED | OUTPATIENT
Start: 2019-01-01 | End: 2019-01-01

## 2019-01-01 RX ORDER — FUROSEMIDE 40 MG/1
80 TABLET ORAL DAILY
Status: ON HOLD | COMMUNITY
End: 2019-01-01

## 2019-01-01 RX ORDER — CYCLOBENZAPRINE HCL 5 MG
5 TABLET ORAL 3 TIMES DAILY PRN
Qty: 15 TABLET | Refills: 0 | Status: SHIPPED | OUTPATIENT
Start: 2019-01-01 | End: 2019-01-01

## 2019-01-01 RX ORDER — ALBUTEROL SULFATE 90 UG/1
1 AEROSOL, METERED RESPIRATORY (INHALATION) 2 TIMES DAILY
Status: ON HOLD | COMMUNITY
End: 2019-01-01

## 2019-01-01 RX ORDER — FAMOTIDINE 10 MG/ML
20 INJECTION, SOLUTION INTRAVENOUS DAILY
Status: DISCONTINUED | OUTPATIENT
Start: 2019-01-01 | End: 2019-01-01

## 2019-01-01 RX ORDER — ISOSORBIDE MONONITRATE 60 MG/1
60 TABLET, EXTENDED RELEASE ORAL DAILY
Status: DISCONTINUED | OUTPATIENT
Start: 2019-01-01 | End: 2020-01-01

## 2019-01-01 RX ORDER — FUROSEMIDE 40 MG/1
80 TABLET ORAL 2 TIMES DAILY
Qty: 120 TABLET | Refills: 11 | Status: ON HOLD | OUTPATIENT
Start: 2019-01-01 | End: 2019-01-01

## 2019-01-01 RX ORDER — METOPROLOL SUCCINATE 50 MG/1
25 TABLET, EXTENDED RELEASE ORAL 2 TIMES DAILY
Status: ON HOLD | COMMUNITY
End: 2019-01-01

## 2019-01-01 RX ORDER — MIRTAZAPINE 30 MG/1
15 TABLET, FILM COATED ORAL NIGHTLY
COMMUNITY

## 2019-01-01 RX ORDER — ASPIRIN 325 MG
325 TABLET ORAL DAILY
Qty: 30 TABLET | Refills: 0 | Status: SHIPPED | OUTPATIENT
Start: 2019-01-01

## 2019-01-01 RX ORDER — HYDROCODONE BITARTRATE AND ACETAMINOPHEN 5; 325 MG/1; MG/1
1 TABLET ORAL EVERY 6 HOURS PRN
Qty: 15 TABLET | Refills: 0 | Status: SHIPPED | OUTPATIENT
Start: 2019-01-01 | End: 2019-01-01

## 2019-01-01 RX ORDER — COLCHICINE 0.6 MG/1
0.6 TABLET ORAL AS NEEDED
Status: ON HOLD | COMMUNITY
End: 2019-01-01

## 2019-01-01 RX ORDER — METHYLPREDNISOLONE 4 MG/1
8 TABLET ORAL
Status: COMPLETED | OUTPATIENT
Start: 2019-01-01 | End: 2019-01-01

## 2019-01-01 RX ORDER — ASPIRIN 81 MG/1
81 TABLET ORAL DAILY
Status: ON HOLD | COMMUNITY
End: 2019-01-01

## 2019-01-01 RX ORDER — HYDRALAZINE HYDROCHLORIDE 25 MG/1
25 TABLET, FILM COATED ORAL EVERY 8 HOURS SCHEDULED
Status: DISCONTINUED | OUTPATIENT
Start: 2019-01-01 | End: 2019-01-01

## 2019-01-01 RX ORDER — WARFARIN SODIUM 2 MG/1
2 TABLET ORAL NIGHTLY
Status: DISCONTINUED | OUTPATIENT
Start: 2019-01-01 | End: 2019-01-01

## 2019-01-01 RX ORDER — COLCHICINE 0.6 MG/1
0.6 TABLET ORAL AS NEEDED
Status: DISCONTINUED | OUTPATIENT
Start: 2019-01-01 | End: 2019-01-01

## 2019-01-01 RX ORDER — HYDRALAZINE HYDROCHLORIDE 25 MG/1
25 TABLET, FILM COATED ORAL EVERY 8 HOURS SCHEDULED
Status: DISCONTINUED | OUTPATIENT
Start: 2019-01-01 | End: 2020-01-01

## 2019-01-01 RX ORDER — METOPROLOL TARTRATE 5 MG/5ML
5 INJECTION INTRAVENOUS EVERY 6 HOURS PRN
Status: DISCONTINUED | OUTPATIENT
Start: 2019-01-01 | End: 2019-01-01

## 2019-01-01 RX ORDER — HEPARIN SODIUM 5000 [USP'U]/ML
5000 INJECTION, SOLUTION INTRAVENOUS; SUBCUTANEOUS EVERY 12 HOURS SCHEDULED
Status: DISCONTINUED | OUTPATIENT
Start: 2019-01-01 | End: 2019-01-01

## 2019-01-01 RX ORDER — PANTOPRAZOLE SODIUM 20 MG/1
20 TABLET, DELAYED RELEASE ORAL
COMMUNITY

## 2019-01-01 RX ORDER — SODIUM CHLORIDE 0.9 % (FLUSH) 0.9 %
10 SYRINGE (ML) INJECTION EVERY 12 HOURS
Status: DISCONTINUED | OUTPATIENT
Start: 2019-01-01 | End: 2019-01-01

## 2019-01-01 RX ORDER — FUROSEMIDE 10 MG/ML
40 INJECTION INTRAMUSCULAR; INTRAVENOUS DAILY
Status: DISCONTINUED | OUTPATIENT
Start: 2019-01-01 | End: 2019-01-01

## 2019-01-01 RX ORDER — FUROSEMIDE 10 MG/ML
40 INJECTION INTRAMUSCULAR; INTRAVENOUS
Status: DISCONTINUED | OUTPATIENT
Start: 2019-01-01 | End: 2019-01-01

## 2019-01-01 RX ORDER — SENNOSIDES 8.6 MG
17.2 TABLET ORAL NIGHTLY
Status: DISCONTINUED | OUTPATIENT
Start: 2019-01-01 | End: 2019-01-01

## 2019-01-01 RX ORDER — ISOSORBIDE MONONITRATE 60 MG/1
60 TABLET, EXTENDED RELEASE ORAL DAILY
COMMUNITY

## 2019-01-01 RX ORDER — IPRATROPIUM BROMIDE AND ALBUTEROL SULFATE 2.5; .5 MG/3ML; MG/3ML
3 SOLUTION RESPIRATORY (INHALATION) EVERY 4 HOURS PRN
Status: DISCONTINUED | OUTPATIENT
Start: 2019-01-01 | End: 2019-01-01

## 2019-01-01 RX ORDER — HYDROCODONE BITARTRATE AND ACETAMINOPHEN 5; 325 MG/1; MG/1
1-2 TABLET ORAL EVERY 6 HOURS PRN
Qty: 45 TABLET | Refills: 0 | Status: ON HOLD | OUTPATIENT
Start: 2019-01-01 | End: 2020-01-01

## 2019-01-01 RX ORDER — ONDANSETRON 2 MG/ML
INJECTION INTRAMUSCULAR; INTRAVENOUS AS NEEDED
Status: DISCONTINUED | OUTPATIENT
Start: 2019-01-01 | End: 2019-01-01 | Stop reason: SURG

## 2019-01-01 RX ORDER — POLYETHYLENE GLYCOL 3350 17 G/17G
17 POWDER, FOR SOLUTION ORAL 2 TIMES DAILY
Status: DISCONTINUED | OUTPATIENT
Start: 2019-01-01 | End: 2019-01-01

## 2019-01-01 RX ORDER — NITROGLYCERIN 0.4 MG/1
0.4 TABLET SUBLINGUAL EVERY 5 MIN PRN
Status: DISCONTINUED | OUTPATIENT
Start: 2019-01-01 | End: 2019-01-01

## 2019-01-01 RX ORDER — EPHEDRINE SULFATE 50 MG/ML
INJECTION, SOLUTION INTRAVENOUS AS NEEDED
Status: DISCONTINUED | OUTPATIENT
Start: 2019-01-01 | End: 2019-01-01 | Stop reason: SURG

## 2019-01-01 RX ORDER — METHYLPREDNISOLONE 4 MG/1
TABLET ORAL
Qty: 21 TABLET | Refills: 0 | Status: ON HOLD | OUTPATIENT
Start: 2019-01-01 | End: 2019-01-01

## 2019-01-01 RX ORDER — GABAPENTIN 100 MG/1
300 CAPSULE ORAL 3 TIMES DAILY
COMMUNITY

## 2019-01-01 RX ORDER — FINASTERIDE 5 MG/1
5 TABLET, FILM COATED ORAL NIGHTLY
Status: DISCONTINUED | OUTPATIENT
Start: 2019-01-01 | End: 2020-01-01

## 2019-01-01 RX ORDER — GABAPENTIN 100 MG/1
200 CAPSULE ORAL 2 TIMES DAILY
Status: DISCONTINUED | OUTPATIENT
Start: 2019-01-01 | End: 2019-01-01

## 2019-01-01 RX ORDER — LISINOPRIL 5 MG/1
5 TABLET ORAL DAILY
Status: ON HOLD | COMMUNITY
End: 2019-01-01

## 2019-01-01 RX ORDER — TRAMADOL HYDROCHLORIDE 50 MG/1
50 TABLET ORAL EVERY 12 HOURS PRN
Status: DISCONTINUED | OUTPATIENT
Start: 2019-01-01 | End: 2019-01-01

## 2019-01-01 RX ORDER — LIDOCAINE HYDROCHLORIDE 10 MG/ML
INJECTION, SOLUTION EPIDURAL; INFILTRATION; INTRACAUDAL; PERINEURAL
Status: COMPLETED
Start: 2019-01-01 | End: 2019-01-01

## 2019-01-01 RX ORDER — FUROSEMIDE 20 MG/1
20 TABLET ORAL 2 TIMES DAILY
Qty: 30 TABLET | Refills: 0 | Status: SHIPPED | OUTPATIENT
Start: 2019-01-01

## 2019-01-01 RX ORDER — SODIUM CHLORIDE 0.9 % (FLUSH) 0.9 %
10 SYRINGE (ML) INJECTION AS NEEDED
Status: DISCONTINUED | OUTPATIENT
Start: 2019-01-01 | End: 2020-01-01

## 2019-01-01 RX ORDER — HYDRALAZINE HYDROCHLORIDE 25 MG/1
25 TABLET, FILM COATED ORAL EVERY 8 HOURS SCHEDULED
Qty: 90 TABLET | Refills: 1 | Status: SHIPPED | OUTPATIENT
Start: 2019-01-01

## 2019-01-01 RX ORDER — ATORVASTATIN CALCIUM 80 MG/1
80 TABLET, FILM COATED ORAL DAILY
COMMUNITY

## 2019-01-01 RX ORDER — METHYLPREDNISOLONE 4 MG/1
4 TABLET ORAL 2 TIMES DAILY
Status: DISCONTINUED | OUTPATIENT
Start: 2019-01-01 | End: 2019-01-01

## 2019-01-01 RX ORDER — LOSARTAN POTASSIUM 50 MG/1
50 TABLET ORAL DAILY
Status: ON HOLD | COMMUNITY
End: 2019-01-01

## 2019-01-01 RX ORDER — METOPROLOL SUCCINATE 25 MG/1
12.5 TABLET, EXTENDED RELEASE ORAL EVERY EVENING
Status: ON HOLD | COMMUNITY
End: 2019-01-01

## 2019-01-01 RX ORDER — TAMSULOSIN HYDROCHLORIDE 0.4 MG/1
0.4 CAPSULE ORAL NIGHTLY
COMMUNITY

## 2019-01-01 RX ORDER — POLYETHYLENE GLYCOL 3350 17 G/17G
17 POWDER, FOR SOLUTION ORAL DAILY
Status: DISCONTINUED | OUTPATIENT
Start: 2019-01-01 | End: 2019-01-01

## 2019-01-01 RX ORDER — FUROSEMIDE 10 MG/ML
40 INJECTION INTRAMUSCULAR; INTRAVENOUS 3 TIMES DAILY
Status: DISCONTINUED | OUTPATIENT
Start: 2019-01-01 | End: 2019-01-01

## 2019-01-01 RX ORDER — SPIRONOLACTONE 25 MG/1
25 TABLET ORAL DAILY
Status: ON HOLD | COMMUNITY
End: 2019-01-01

## 2019-01-01 RX ORDER — POTASSIUM CHLORIDE 750 MG/1
10 TABLET, EXTENDED RELEASE ORAL DAILY
Status: ON HOLD | COMMUNITY
End: 2019-01-01

## 2019-01-01 RX ORDER — LACTULOSE 10 G/15ML
10 SOLUTION ORAL DAILY PRN
COMMUNITY

## 2019-01-01 RX ORDER — METHYLPREDNISOLONE 4 MG/1
4 TABLET ORAL
Status: COMPLETED | OUTPATIENT
Start: 2019-01-01 | End: 2019-01-01

## 2019-01-01 RX ORDER — ASPIRIN 325 MG
325 TABLET ORAL DAILY
Status: DISCONTINUED | OUTPATIENT
Start: 2019-01-01 | End: 2019-01-01

## 2019-01-01 RX ORDER — METOPROLOL SUCCINATE 25 MG/1
25 TABLET, EXTENDED RELEASE ORAL
Qty: 60 TABLET | Refills: 11 | Status: SHIPPED | OUTPATIENT
Start: 2019-01-01 | End: 2019-01-01

## 2019-01-01 RX ORDER — FLUTICASONE PROPIONATE 50 MCG
2 SPRAY, SUSPENSION (ML) NASAL DAILY
COMMUNITY

## 2019-01-01 RX ORDER — WARFARIN SODIUM 2 MG/1
2 TABLET ORAL
Status: DISCONTINUED | OUTPATIENT
Start: 2019-01-01 | End: 2019-01-01

## 2019-01-01 RX ORDER — CYCLOBENZAPRINE HCL 10 MG
10 TABLET ORAL 3 TIMES DAILY PRN
Status: DISCONTINUED | OUTPATIENT
Start: 2019-01-01 | End: 2019-01-01

## 2019-01-01 RX ORDER — GLYCOPYRROLATE 0.2 MG/ML
INJECTION, SOLUTION INTRAMUSCULAR; INTRAVENOUS AS NEEDED
Status: DISCONTINUED | OUTPATIENT
Start: 2019-01-01 | End: 2019-01-01 | Stop reason: SURG

## 2019-01-01 RX ORDER — SODIUM CHLORIDE, SODIUM LACTATE, POTASSIUM CHLORIDE, CALCIUM CHLORIDE 600; 310; 30; 20 MG/100ML; MG/100ML; MG/100ML; MG/100ML
INJECTION, SOLUTION INTRAVENOUS CONTINUOUS
Status: DISCONTINUED | OUTPATIENT
Start: 2019-01-01 | End: 2020-01-01

## 2019-01-01 RX ORDER — TRAMADOL HYDROCHLORIDE 50 MG/1
50 TABLET ORAL EVERY 12 HOURS
Status: DISCONTINUED | OUTPATIENT
Start: 2019-01-01 | End: 2019-01-01

## 2019-01-01 RX ORDER — FINASTERIDE 5 MG/1
5 TABLET, FILM COATED ORAL NIGHTLY
COMMUNITY

## 2019-01-01 RX ORDER — CYCLOBENZAPRINE HCL 5 MG
5 TABLET ORAL 3 TIMES DAILY PRN
Status: DISCONTINUED | OUTPATIENT
Start: 2019-01-01 | End: 2019-01-01

## 2019-01-01 RX ORDER — DEXTROSE MONOHYDRATE 25 G/50ML
50 INJECTION, SOLUTION INTRAVENOUS ONCE
Status: COMPLETED | OUTPATIENT
Start: 2019-01-01 | End: 2019-01-01

## 2019-01-01 RX ORDER — CISATRACURIUM BESYLATE 2 MG/ML
INJECTION INTRAVENOUS AS NEEDED
Status: DISCONTINUED | OUTPATIENT
Start: 2019-01-01 | End: 2019-01-01 | Stop reason: SURG

## 2019-01-01 RX ORDER — NEOSTIGMINE METHYLSULFATE 1 MG/ML
INJECTION INTRAVENOUS AS NEEDED
Status: DISCONTINUED | OUTPATIENT
Start: 2019-01-01 | End: 2019-01-01 | Stop reason: SURG

## 2019-01-01 RX ORDER — MIDAZOLAM HYDROCHLORIDE 1 MG/ML
INJECTION INTRAMUSCULAR; INTRAVENOUS
Status: COMPLETED
Start: 2019-01-01 | End: 2019-01-01

## 2019-01-01 RX ADMIN — LIDOCAINE HYDROCHLORIDE 50 MG: 10 INJECTION, SOLUTION EPIDURAL; INFILTRATION; INTRACAUDAL; PERINEURAL at 13:03:00

## 2019-01-01 RX ADMIN — HYDRALAZINE HYDROCHLORIDE 25 MG: 25 TABLET, FILM COATED ORAL at 14:31:00

## 2019-01-01 RX ADMIN — FINASTERIDE 5 MG: 5 TABLET, FILM COATED ORAL at 20:28:00

## 2019-01-01 RX ADMIN — FINASTERIDE 5 MG: 5 TABLET, FILM COATED ORAL at 21:02:00

## 2019-01-01 RX ADMIN — SODIUM CHLORIDE: 9 INJECTION, SOLUTION INTRAVENOUS at 12:54:00

## 2019-01-01 RX ADMIN — PHENYLEPHRINE HCL 40 MCG: 10 MG/ML VIAL (ML) INJECTION at 13:06:00

## 2019-01-01 RX ADMIN — CISATRACURIUM BESYLATE 10 MG: 2 INJECTION INTRAVENOUS at 13:03:00

## 2019-01-01 RX ADMIN — PHENYLEPHRINE HCL 40 MCG: 10 MG/ML VIAL (ML) INJECTION at 13:10:00

## 2019-01-01 RX ADMIN — ALLOPURINOL 200 MG: 100 TABLET ORAL at 17:27:00

## 2019-01-01 RX ADMIN — CEFAZOLIN SODIUM 2 G: 1 INJECTION, POWDER, FOR SOLUTION INTRAMUSCULAR; INTRAVENOUS at 13:14:00

## 2019-01-01 RX ADMIN — ONDANSETRON 4 MG: 2 INJECTION INTRAMUSCULAR; INTRAVENOUS at 10:53:00

## 2019-01-01 RX ADMIN — SODIUM CHLORIDE, SODIUM LACTATE, POTASSIUM CHLORIDE, CALCIUM CHLORIDE: 600; 310; 30; 20 INJECTION, SOLUTION INTRAVENOUS at 02:26:00

## 2019-01-01 RX ADMIN — ALFUZOSIN HYDROCHLORIDE 10 MG: 10 TABLET, EXTENDED RELEASE ORAL at 08:01:00

## 2019-01-01 RX ADMIN — ONDANSETRON 4 MG: 2 INJECTION INTRAMUSCULAR; INTRAVENOUS at 13:40:00

## 2019-01-01 RX ADMIN — ISOSORBIDE MONONITRATE 60 MG: 60 TABLET, EXTENDED RELEASE ORAL at 08:01:00

## 2019-01-01 RX ADMIN — GABAPENTIN 300 MG: 300 CAPSULE ORAL at 21:02:00

## 2019-01-01 RX ADMIN — EPHEDRINE SULFATE 5 MG: 50 INJECTION, SOLUTION INTRAVENOUS at 13:06:00

## 2019-01-01 RX ADMIN — NEOSTIGMINE METHYLSULFATE 3 MG: 1 INJECTION INTRAVENOUS at 14:44:00

## 2019-01-01 RX ADMIN — HYDRALAZINE HYDROCHLORIDE 25 MG: 25 TABLET, FILM COATED ORAL at 21:02:00

## 2019-01-01 RX ADMIN — PHENYLEPHRINE HCL 40 MCG: 10 MG/ML VIAL (ML) INJECTION at 13:14:00

## 2019-01-01 RX ADMIN — SODIUM CHLORIDE, SODIUM LACTATE, POTASSIUM CHLORIDE, CALCIUM CHLORIDE: 600; 310; 30; 20 INJECTION, SOLUTION INTRAVENOUS at 16:44:00

## 2019-01-01 RX ADMIN — HYDRALAZINE HYDROCHLORIDE 25 MG: 25 TABLET, FILM COATED ORAL at 17:27:00

## 2019-01-01 RX ADMIN — GABAPENTIN 300 MG: 300 CAPSULE ORAL at 20:29:00

## 2019-01-01 RX ADMIN — GABAPENTIN 300 MG: 300 CAPSULE ORAL at 08:01:00

## 2019-01-01 RX ADMIN — GABAPENTIN 300 MG: 300 CAPSULE ORAL at 16:43:00

## 2019-01-01 RX ADMIN — ALLOPURINOL 200 MG: 100 TABLET ORAL at 08:01:00

## 2019-01-01 RX ADMIN — SODIUM CHLORIDE: 9 INJECTION, SOLUTION INTRAVENOUS at 14:45:00

## 2019-01-01 RX ADMIN — GLYCOPYRROLATE 0.4 MG: 0.2 INJECTION, SOLUTION INTRAMUSCULAR; INTRAVENOUS at 14:44:00

## 2019-01-01 RX ADMIN — GABAPENTIN 300 MG: 300 CAPSULE ORAL at 17:27:00

## 2019-01-01 RX ADMIN — ISOSORBIDE MONONITRATE 60 MG: 60 TABLET, EXTENDED RELEASE ORAL at 18:46:00

## 2019-01-01 RX ADMIN — CISATRACURIUM BESYLATE 2 MG: 2 INJECTION INTRAVENOUS at 13:35:00

## 2019-01-22 NOTE — ED PROVIDER NOTES
Patient Seen in: [facility] Emergency Department    History   Patient presents with:  Swelling Edema (cardiovascular, metabolic)    Stated Complaint: left leg pain for 1 month, bypass surgery 2 years ago    HPI    50-year-old white male who presents °F (36.4 °C)   Temp src Temporal   SpO2 97 %   O2 Device None (Room air)       Current:BP (!) 138/97   Pulse 91   Temp 97.6 °F (36.4 °C) (Temporal)   Resp 21   Ht 185.4 cm (6' 1\")   Wt 120.2 kg   SpO2 94%   BMI 34.96 kg/m²         Physical Exam    Well-de stable during his visit here in the emergency room and had no pain while he is here. His clinical exam is generally unremarkable. He has good strength in the left lower extremity. Good range of motion. He has a palpable dorsalis pedis pulse noted.   Renu Nuno

## 2019-01-22 NOTE — ED INITIAL ASSESSMENT (HPI)
Pt presents with pain to LLE, had testing completed at [place] over a month ago, has records with him. Pain and swelling are only getting worse. No injury associated with this. Pain is down the side and in the front of his leg. PMH of stent in L leg.

## 2019-01-23 NOTE — ED NOTES
ED physician at bedside with doppler. Bilateral pedal pulses noted with doppler. Strong femoral pulse noted to left lower extremity as well.

## 2019-04-22 PROBLEM — E87.5 HYPERKALEMIA: Status: ACTIVE | Noted: 2019-01-01

## 2019-04-22 PROBLEM — N18.9 ACUTE RENAL FAILURE SUPERIMPOSED ON CHRONIC KIDNEY DISEASE, UNSPECIFIED CKD STAGE, UNSPECIFIED ACUTE RENAL FAILURE TYPE (HCC): Status: ACTIVE | Noted: 2019-01-01

## 2019-04-22 PROBLEM — N17.9 ACUTE RENAL FAILURE SUPERIMPOSED ON CHRONIC KIDNEY DISEASE (HCC): Status: ACTIVE | Noted: 2019-01-01

## 2019-04-22 PROBLEM — N17.9 ACUTE RENAL FAILURE SUPERIMPOSED ON CHRONIC KIDNEY DISEASE, UNSPECIFIED CKD STAGE, UNSPECIFIED ACUTE RENAL FAILURE TYPE (HCC): Status: ACTIVE | Noted: 2019-01-01

## 2019-04-22 PROBLEM — N18.9 ACUTE RENAL FAILURE SUPERIMPOSED ON CHRONIC KIDNEY DISEASE (HCC): Status: ACTIVE | Noted: 2019-01-01

## 2019-04-22 PROBLEM — E87.5 ACUTE HYPERKALEMIA: Status: ACTIVE | Noted: 2019-01-01

## 2019-04-22 NOTE — H&P
[facility]  History and Physical     Lucien Netta Patient Status:  Emergency    1945 MRN [id number]   Location [address] [facility] Attending No att. providers found   Hosp Day # 0 PCP ALEX Mark     Chief Complain puff into the lungs 2 (two) times daily. Disp:  Rfl:    Fluticasone Propionate 50 MCG/ACT Nasal Suspension 2 sprays by Each Nare route daily. Disp:  Rfl:    colchicine 0.6 MG Oral Tab Take 0.6 mg by mouth as needed.  Disp:  Rfl:    Magnesium Oxide 420 MG Or neurological deficits. CNII-XII grossly intact. Musculoskeletal: Moves all extremities. Extremities: trace LE edema L>R, no cyanosis. Integument: No rashes or lesions. Psychiatric: Appropriate mood and affect.       Diagnostic Data:      Labs:  Recent

## 2019-04-22 NOTE — ED INITIAL ASSESSMENT (HPI)
Patient  state of rectal bleeding for about one week. Epistaxis left nares and hematuria  He took kaopectate.  He is on thinner

## 2019-04-22 NOTE — ED PROVIDER NOTES
Patient Seen in: [facility] Emergency Department    History   Patient presents with:  Bleeding (hematologic)    Stated Complaint: bleeding from orifices, on blood thinner    HPI    31-year-old male comes to the hospital with a complaint of having di Exam    HEENT : NCAT, EOMI, PEERL, throat clear, neck supple, no JVD, trachea midline, No LAD nose without active bleeding at this time  Heart: S1S2 normal. No murmurs, regular rate and rhythm  Lungs: Clear to auscultation bilaterally  Abdomen: Soft nonten intervals and axes as noted on EKG Report.   Rate: 70  Rhythm: Paced  Reading: Agreed                  Medications   Sodium Polystyrene Sulfonate (KAYEXALATE) powder 15 g (has no administration in time range)   calcium gluconate 1 g in sodium chloride 0.9%

## 2019-04-22 NOTE — CONSULTS
[facility]  Report of Consultation    Danika Linares Patient Status:  Emergency    1945 MRN [id number]   Location [address] [facility] Attending No att. providers found   Hosp Day # 0 PCP ALEX Rojas       Assessment / Estela mg by mouth 2 (two) times daily. Albuterol Sulfate  (90 Base) MCG/ACT Inhalation Aero Soln Inhale 1 puff into the lungs 2 (two) times daily. Fluticasone Propionate 50 MCG/ACT Nasal Suspension 2 sprays by Each Nare route daily.    colchicine 0.6 M extremities  Skin: Warm and dry, no rashes      Laboratory Data:  Lab Results   Component Value Date    WBC 7.6 04/22/2019    HGB 12.5 04/22/2019    HCT 39.8 04/22/2019    .0 04/22/2019    CREATSERUM 3.28 04/22/2019     04/22/2019     0

## 2019-04-22 NOTE — PROGRESS NOTES
[facility] Pharmacy Note:  Renal Dose Adjustment for Metoclopramide (REGLAN)      Varsha Rebollar has been prescribed metoclopramide 10 mg q8hr prn. Est CrCl: Estimated Creatinine Clearance: 23 mL/min (A) (based on SCr of 3.28 mg/dL (H)).     His calculated creati

## 2019-04-23 NOTE — PLAN OF CARE
Received records from [facility]:    History of HFrEF, CAD s/p redo CABG (LIMA-diag-distal LAD, SVG-OM and SVG-RCA) and PCI, MVR bioprosthetic 2014, ICD placement, afib on coumadin, left CEA 6/16/2015 (AKBAR 16-49% 8/13/2018), left popliteal aneurysm s/p bypa

## 2019-04-23 NOTE — PLAN OF CARE
Received patient awake in bed. Friends are visiting him. On room air, clear lungs. Denies any pain. VPaced on tele. Iv fluids infusing. Up steady on his feet. Scd applied. Bed alarm is on. Call light within reach.  On contact isolation to r/o cdiff, stool s pressure on venipuncture sites to achieve adequate hemostasis  - Assess for signs and symptoms of internal bleeding  - Monitor lab trends  Outcome: Progressing

## 2019-04-23 NOTE — PROGRESS NOTES
[facility]  Progress Note     Nehemiah Dixon Patient Status:  Inpatient    1945 MRN [id number]   [facility] 3NE-A Attending Percy Cortez MD   Hosp Day # 1 PCP ALEX Umana     Chief Complaint: bleeding    S: Patient no b Oral BID   • aspirin  81 mg Oral Daily   • carvedilol  12.5 mg Oral BID with meals   • gabapentin  100 mg Oral TID   • Isosorbide Mononitrate ER  60 mg Oral Daily   • Metoprolol Succinate ER  25 mg Oral Daily Beta Blocker   • PEG 3350  17 g Oral BID   • Al

## 2019-04-23 NOTE — PLAN OF CARE
NURSING ADMISSION NOTE      Patient admitted via Cart  Oriented to room. Safety precautions initiated. Bed in low position. Call light in reach. Cardiology consulted and made aware of critical trop and hypotension.   Patient asymptomatic at this t

## 2019-04-23 NOTE — CONSULTS
[facility] Cardiology Consult Note    Mario Johnson Patient Status:  Inpatient    1945 MRN [id number]   [facility] 3NE-A Attending Nasreen Lassiter MD   Hosp Day # 1 PCP Tomasa Rosa, APRN     68year old male, consulted for NSTEMI  · CAD s [facility])    • Coronary atherosclerosis    • Depression    • Gout    • Heart attack ([facility] [address]    • Heart valve disease    • High blood pressure    • High cholesterol    • Osteoarthritis    • Renal disorder    • Sleep apnea      Past Surgical History:   Procedure L

## 2019-04-23 NOTE — PROGRESS NOTES
[facility]  Nephrology Progress Note    Faith Navarro Attending:  Angel Swenson MD       Assessment and Plan:    1) LORENZO- due to dehydration exac by ARB effect; appears fairly dry by exam. Meds are otherwise benign; no recent NSAIDS, contrast studies, Labs:   Lab Results   Component Value Date    WBC 7.1 04/23/2019    HGB 11.6 04/23/2019    HCT 36.7 04/23/2019    .0 04/23/2019    CREATSERUM 3.15 04/23/2019     04/23/2019     04/23/2019    K 5.7 04/23/2019     04/23/2019

## 2019-04-24 NOTE — PROGRESS NOTES
[facility]  Cardiology Progress Note    Sabra Mann Patient Status:  Inpatient    1945 MRN [id number]   [facility] 3NE-A Attending Cecilia Decker MD   Hosp Day # 2 PCP ALEX Virk     Subjective:  Feeling good, hoping to Mean     gradient (D): 2-4mm Hg at a HR of ~80 bpm. Valve area by continuity     equation (using LVOT flow): 1.6cm^2. 5. Left atrium: The left atrium was markedly dilated. 6. Right ventricle: The cavity size was mildly increased.  Systolic function     wa

## 2019-04-24 NOTE — PLAN OF CARE
Pt A&Ox4. Room air. AICD pacemaker, V paced with underlying afib. No complaints of pain. No recent bleeding episodes. Monitoring potassium level. Staff will continue to monitor.        Problem: Patient/Family Goals  Goal: Patient/Family Long Term Goal  Desc symptoms of internal bleeding  - Monitor lab trends  Outcome: Progressing

## 2019-04-24 NOTE — DISCHARGE SUMMARY
[facility]OSPITALIST  DISCHARGE SUMMARY     Jamal Cooper Patient Status:  Inpatient    1945 MRN [id number]   [facility] 3NE-A Attending Hitesh Anguiano MD   Hosp Day # 2 PCP Anthony Mijares APRN     Date of Admission: 2019  Date of D chronically elevated troponin, ECHO completed, no further work-up recommended. Patient is doing well and plan for home today. His spironolactone and ARB have been discontinued. He is to resume his ACEi on Friday. His Lasix dose has been reduced.  Patient wa as:  NEURONTIN      Take 200 mg by mouth 2 (two) times daily. Refills:  0     Isosorbide Mononitrate ER 60 MG Tb24  Commonly known as:  IMDUR      Take 60 mg by mouth daily.    Refills:  0     lisinopril 5 MG Tabs  Commonly known as:  PRINIVIL,ZESTRIL distress. Respiratory: Clear to auscultation bilaterally. Cardiovascular: S1, S2. Regular rate and rhythm. Abdomen: Soft, nontender, nondistended. Positive bowel sounds. No rebound or guarding. Musculoskeletal: Moves all extremities.   Extremities:

## 2019-04-24 NOTE — CONSULTS
[facility] Urology   Consultation Note    [facility] Patient Status:  Inpatient    1945 MRN [id number]   [facility] 3NE-A Attending Dalila Ahumada, MD   Hosp Day # 2 PCP ALEX Anderson     Reason for Consultation:  Hem PRN  •  Pantoprazole Sodium (PROTONIX) EC tab 20 mg, 20 mg, Oral, BID AC  •  atorvastatin (LIPITOR) tab 80 mg, 80 mg, Oral, Nightly  •  docusate sodium (COLACE) cap 100 mg, 100 mg, Oral, BID  •  sodium bicarbonate 75 mEq in sodium chloride 0.45 % 1,000 mL Laboratory Data:  Lab Results   Component Value Date    WBC 6.5 04/24/2019    HGB 10.9 04/24/2019    HCT 33.5 04/24/2019    .0 04/24/2019    CREATSERUM 2.72 04/24/2019    BUN 86 04/24/2019     04/24/2019    K 5.5 04/24/2019     04/24/2

## 2019-04-24 NOTE — PLAN OF CARE
Problem: Patient/Family Goals  Goal: Patient/Family Long Term Goal  Description  Patient's Long Term Goal: Will have no bleeding episode    Interventions:  - monitor for bleeding  - inr daily  - monitor vitals  - See additional Care Plan goals for specif underlying atrial fibrillation, denies CP or SOB. VSS, afebrile. On RA. Denies pain. Denies rectal bleeding or hematuria. Refused colace and miralax today. Patient reports having normal BM yesterday. Labs wnl today. Ambulating independently.

## 2019-04-24 NOTE — PROGRESS NOTES
[facility]  Nephrology Progress Note    Afshan Dasilva Attending:  Deric Can MD       Assessment and Plan:    1) LORENZO- due to dehydration exac by ACE-I / ARB effect- improving.  Will decrease lasix on discharge to 40 mg qd; to f/u with PCP at [facility] 04/24/2019     04/24/2019    K 5.5 04/24/2019     04/24/2019    CO2 23.0 04/24/2019    GLU 81 04/24/2019    CA 8.5 04/24/2019    INR 2.27 04/24/2019    PTP 26.5 04/24/2019       Imaging: All imaging studies reviewed.     Meds:     Current Facil

## 2019-04-25 NOTE — PROGRESS NOTES
Attempted to reach pt for TCM. Pt states he is not available right now and requested NCM call back tomorrow. Will f/u as requested.

## 2019-08-26 PROBLEM — I50.9 ACUTE ON CHRONIC CONGESTIVE HEART FAILURE (HCC): Status: ACTIVE | Noted: 2019-01-01

## 2019-08-26 PROBLEM — R73.9 HYPERGLYCEMIA: Status: ACTIVE | Noted: 2019-01-01

## 2019-08-26 PROBLEM — E87.70 HYPERVOLEMIA, UNSPECIFIED HYPERVOLEMIA TYPE: Status: ACTIVE | Noted: 2019-01-01

## 2019-08-26 PROBLEM — I25.708 CORONARY ARTERY DISEASE INVOLVING CORONARY BYPASS GRAFT OF NATIVE HEART WITH OTHER FORMS OF ANGINA PECTORIS (HCC): Status: ACTIVE | Noted: 2019-01-01

## 2019-08-26 PROBLEM — R77.8 ELEVATED TROPONIN: Status: ACTIVE | Noted: 2019-01-01

## 2019-08-26 PROBLEM — I50.9 ACUTE ON CHRONIC CONGESTIVE HEART FAILURE, UNSPECIFIED HEART FAILURE TYPE (HCC): Status: ACTIVE | Noted: 2019-01-01

## 2019-08-27 PROCEDURE — 99223 1ST HOSP IP/OBS HIGH 75: CPT | Performed by: INTERNAL MEDICINE

## 2019-08-27 PROCEDURE — 93289 INTERROG DEVICE EVAL HEART: CPT | Performed by: NURSE PRACTITIONER

## 2019-08-27 NOTE — PROGRESS NOTES
08/26/19 2322 08/26/19 2324 08/26/19 2327   Vital Signs   Pulse 63 64 66   Heart Rate Source Monitor  --   --    Resp 26 24 25   /85 137/69 142/69   BP Location Left arm Left arm Left arm   BP Method Automatic Automatic Automatic   Patient Positio

## 2019-08-27 NOTE — ED PROVIDER NOTES
Patient Seen in: [facility] Emergency Department    History   Patient presents with:  Swelling Edema (cardiovascular, metabolic)  Dyspnea HEATHER SOB (respiratory)  Congestive Heart Failure (cardiovascular)    Stated Complaint: swelling, sob,     HPI apnea               Past Surgical History:   Procedure Laterality Date   • CABG     • CARDIAC PACEMAKER PLACEMENT     • PACEMAKER MONITOR     • VALVE REPAIR                      Social History    Tobacco Use      Smoking status: Former Smoker        Quit d components:    Pro-Beta Natriuretic Peptide 7,469 (*)     All other components within normal limits   PROTHROMBIN TIME (PT) - Abnormal; Notable for the following components:    PT 28.5 (*)     INR 2.48 (*)     All other components within normal limits   PT seen at the lung bases.                    Dictated by: Lilly Page MD on 8/26/2019 at 19:04         Approved by: Lilly Page MD                     [facility]   Patient presents to the emergency department with a CHF exacerbation he has underlying kid Coagulopathy ([facility] [address] D68.9 Unknown Yes    Coronary artery disease involving coronary bypass graft of native heart with other forms of angina pectoris (HCC) I25.708 8/26/2019     Elevated troponin R74.8 8/26/2019     Essential hypertension I10 Unknown Yes

## 2019-08-27 NOTE — PROGRESS NOTES
08/27/19 0640   Clinical Encounter Type   Visited With Patient   Referral To Nurse  ( provided HPOA form which patient would like to fill out after he speaks to his prospective HPOA (his laywer.) )   Caodaism Encounters   Spiritual Requests Dur

## 2019-08-27 NOTE — CARDIAC REHAB
Initiated HF education with pt. Pt could use more reinforcement of information.  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

## 2019-08-27 NOTE — CONSULTS
[address] [facility] CONSULT      Patient: Raheel Aly Date: 2019     : 1945 Attending: Loi Saavedra MD   68year old male      A/P:  Acute on chronic HFrEF (EF 25%) due to ICM, s/p BiV-ICD  Troponin elevation - mild - in sett any current chest pain. He is no longer SOB and in fact feels much better. He denies orthopnea, PND. He does not have palpitations, lightheadedness, dizziness, falls, syncopal events.       Past Medical History:   Diagnosis Date   • Atherosclerosis of coron partner violence:        Fear of current or ex partner: Not on file        Emotionally abused: Not on file        Physically abused: Not on file        Forced sexual activity: Not on file    Other Topics      Concerns:        Not on file    Social History 0487  Gross per 24 hour   Intake 20 ml   Output 800 ml   Net -780 ml       Physical Assessment:  Gen: alert, comfortable  HEENT: MMM  Neck: +JVD, +HJR, supple  Heart: RRR, S1,S2, +S3  Lungs: bibasilar crackles  Abd: soft, NT, ND  Ext: 1+ edema to knees  Ne exacerbation. Either focal edema or pneumonia is seen at the lung bases.

## 2019-08-27 NOTE — ICD/PM
Remote interrogation discussed with Minerva Chen from [employer]. Reveals the following:    Since July, has only had ~13 seconds Afib. Prior to July, he was noted to be in Afib 100% of the time. There were no stored events.   Pacing and sensing thresholds were ap

## 2019-08-27 NOTE — PROGRESS NOTES
08/27/19 3131   Clinical Encounter Type   Visited With Patient   Referral To Nurse;Physician  ( answered questions regarding DNR/POLST form options.   Patient just wanted information. )

## 2019-08-27 NOTE — DIETARY NOTE
[address]      Admitting diagnosis:  Elevated troponin [R74.8]  Coronary artery disease involving coronary bypass graft of native heart with other forms of angina pectoris ([facility] [I25.138]  Hypervolemia, unspecified

## 2019-08-27 NOTE — PLAN OF CARE
Assumed care of patient around 0730. Patient alert and oriented sitting in bed. Denies chest pain or shortness of breath. O2 weaned to 2L, will attempt to wean to room as patient does not wear home O2. Cardiology and nephrology at bedside.  Plan to interrog

## 2019-08-27 NOTE — H&P
[facility]  History and Physical     Jamal Fret Patient Status:  Emergency    1945 MRN [id number]   Location [address] [facility] [address] Attending Darlin Miguel MD   Hosp Day # 0 PCP None Pcp     Chief Complaint: SOB    Histor finasteride 5 MG Oral Tab Take 5 mg by mouth daily. Disp:  Rfl:    Pantoprazole Sodium 20 MG Oral Tab EC Take 20 mg by mouth 2 (two) times daily before meals. Disp:  Rfl:    lisinopril 5 MG Oral Tab Take 5 mg by mouth daily.  Disp:  Rfl:    Warfarin Sodi deformity. Abdomen: Soft, nontender, nondistended. Positive bowel sounds. Neurologic: No focal neurological deficits. CNII-XII grossly intact. Musculoskeletal: Moves all extremities. Extremities: No edema or cyanosis.   Integument: No rashes or lesion

## 2019-08-27 NOTE — PROGRESS NOTES
[facility] Dosing Service  Warfarin (Coumadin) Initial Dosing    Latisha Foster is a 68year old male for whom pharmacy has been consulted to dose warfarin (COUMADIN) for Treatment of venous thrombosis by Dr. Skye Montes De Oca.   Based on this indication, goal INR i

## 2019-08-27 NOTE — ED NOTES
Patient voiding via urinal. Patient was updated on admission plan and hold up on bed assignment. Patient verbalized understanding.

## 2019-08-27 NOTE — PROGRESS NOTES
[facility]  Progress Note     Bradley Peacock Patient Status:  Inpatient    1945 MRN [id number]   [facility] 2NE-A Attending Huong Potter MD   [address] Day # 1 PCP None Pcp     Chief Complaint: SOB    S: Patient without acute ev Oral BID   • atorvastatin  80 mg Oral Daily   • docusate sodium  100 mg Oral BID   • finasteride  5 mg Oral Nightly   • gabapentin  200 mg Oral BID   • Isosorbide Mononitrate ER  60 mg Oral Daily   • Pantoprazole Sodium  20 mg Oral BID AC   • Alfuzosin HCl

## 2019-08-27 NOTE — PLAN OF CARE
NURSING ADMISSION NOTE      Patient admitted via cart  Oriented to room. Safety precautions initiated. Bed in low position. Call light in reach. Pt admitted for increased SOB from home. CHF probnp [id number].  Assessment completed, Database and home med

## 2019-08-27 NOTE — CONSULTS
[facility]  Report of Consultation    Alyssa Itz Patient Status:  Inpatient    1945 MRN [id number]   [facility] 2NE-A Attending Chela Lopez MD   Hosp Day # 1 PCP None Pcp       Assessment / Plan:     1) CKD 4- baseline Cr reports that he quit smoking about 19 years ago.  He has never used smokeless tobacco.    Allergies:    Seafood                 ANAPHYLAXIS    Medications:    Current Facility-Administered Medications:   •  acetaminophen (TYLENOL) tab 650 mg, 650 mg, Oral, 800 ml   Net -540 ml     Wt Readings from Last 3 Encounters:  08/27/19 : 251 lb 5.2 oz  04/22/19 : 262 lb  01/22/19 : 265 lb    General: awkae alert  HEENT: No scleral icterus, MMM  Neck: Supple, no MIRELLA or thyromegaly  Cardiac: Regular rate and rhythm, S1,

## 2019-08-27 NOTE — PROGRESS NOTES
[address] [facility] Dosing Service  Warfarin (Coumadin) Subsequent Dosing    Jamal Cooper is a 68year old male for whom pharmacy has been dosing warfarin (Coumadin).  Goal INR is 2-3    Recent Labs   Lab 08/26/19  1818 08/27/19  0749   INR 2.48* 2.17*     Potenti

## 2019-08-27 NOTE — PLAN OF CARE
Pt had 2 runs of 7 beats of v tach @0243 and 0601 . Pt asymptomatic, strips placed in chart. Will notify MD on rounds and continue to monitor pt.

## 2019-08-27 NOTE — RESPIRATORY THERAPY NOTE
ABDIRAHMAN - Equipment Use Daily Summary:                  . Set Mode: AUTO CPAP WITH C-FLEX                . Usage in hours: 1:29                . 90% Pressure (EPAP) level: 6.0                . 90% Insp. Pressure (IPAP): Fela Viera  AHI: 35.7

## 2019-08-28 PROBLEM — Z99.89 OSA ON CPAP: Status: ACTIVE | Noted: 2019-01-01

## 2019-08-28 PROBLEM — G47.33 OSA ON CPAP: Status: ACTIVE | Noted: 2019-01-01

## 2019-08-28 PROCEDURE — 99232 SBSQ HOSP IP/OBS MODERATE 35: CPT | Performed by: INTERNAL MEDICINE

## 2019-08-28 NOTE — CONSULTS
[facility] Dosing Service  Warfarin (Coumadin) Subsequent Dosing    Zulma Trotter is a 68year old male for whom pharmacy has been dosing warfarin (Coumadin). Goal INR is 2-3    Recent Labs   Lab 08/26/19  1818 08/27/19  0749 08/28/19  0605   INR 2.48* 2.

## 2019-08-28 NOTE — PLAN OF CARE
Assumed care for pt. At 1. Pt. Sitting up in chair, denies pain or SOB. Ambulated in halls without difficulty. A&Ox4. Weaned oxygen off, now on RA while awake. /ABDIRAHMAN protocol. Pt. Declining CPAP tonight. Dry cough noted.  A-paced on tele, 2+ edema note replacement therapy as ordered  Outcome: Progressing     Problem: RESPIRATORY - ADULT  Goal: Achieves optimal ventilation and oxygenation  Description  INTERVENTIONS:  - Assess for changes in respiratory status  - Assess for changes in mentation and behavi

## 2019-08-28 NOTE — PROGRESS NOTES
[facility]  Nephrology Progress Note    Margarita Holstein Attending:  Sha Courtney MD       Assessment and Plan:    1) CKD 4- baseline Cr > 2 mg/dl due to hypertensive nephropathy +/- cardiorenal syndrome; previous eval for other etiologies unrevealin 08/28/2019    BUN 50 08/28/2019     08/28/2019    K 4.3 08/28/2019     08/28/2019    CO2 28.0 08/28/2019    GLU 93 08/28/2019    CA 8.7 08/28/2019    INR 2.10 08/28/2019    PTP 24.9 08/28/2019       Imaging: All imaging studies reviewed.     Me

## 2019-08-28 NOTE — PLAN OF CARE
Patient is alert and oriented x4. A paced on tele. Oxygenation is 096% on RA. L sounds diminished. VSS. Patient denies chest pain, shortness of breath, palpitations. Reports shoulder pain from laying in the bed.  Patient is resting comfortably in bed at [facility] ADULT  Goal: Achieves optimal ventilation and oxygenation  Description  INTERVENTIONS:  - Assess for changes in respiratory status  - Assess for changes in mentation and behavior  - Position to facilitate oxygenation and minimize respiratory effort  - Oxyg

## 2019-08-28 NOTE — PROGRESS NOTES
[facility]  Cardiology Progress Note    Corean Ax Patient Status:  Inpatient    1945 MRN [id number]   [facility] 2NE-A Attending Niru Sung MD   Hosp Day # 2 PCP None Pcp     Subjective:  No complaints of chest pain or Inhalation BID         Assessment:  · Acute on Chronic systolic heart failure due to ICM, LVEF 25% - diuresing with IV lasix. Down 5 pounds overnight. · Bi-V ICD - interrogation without stored events.  Since July only 13 seconds of afib, prior to that alexis

## 2019-08-28 NOTE — PROGRESS NOTES
[facility]  Progress Note     Margarita Holstein Patient Status:  Inpatient    1945 MRN [id number]   [facility] 2NE-A Attending Sha Courtney MD   Hosp Day # 2 PCP None Pcp     Chief Complaint: SOB    S: Patient without acute ev 08/27/19  0613   TROP 0.067* 0.066*            Imaging: Imaging data reviewed in Epic.     Medications:   • furosemide  40 mg Intravenous TID   • allopurinol  100 mg Oral BID   • atorvastatin  80 mg Oral Daily   • docusate sodium  100 mg Oral BID   • finast

## 2019-08-29 PROCEDURE — 99232 SBSQ HOSP IP/OBS MODERATE 35: CPT | Performed by: INTERNAL MEDICINE

## 2019-08-29 NOTE — PROGRESS NOTES
[facility]  Progress Note     Jamal Fret Patient Status:  Inpatient    1945 MRN [id number]   [facility] 2NE-A Attending Harriett Sofia MD   Hosp Day # 3 PCP None Pcp     Chief Complaint: SOB    S: Patient without acute ev (H)).    Recent Labs   Lab 08/27/19  0749 08/28/19  0605 08/29/19  0616   PTP 25.5* 24.9* 26.2*   INR 2.17* 2.10* 2.24*       Recent Labs   Lab 08/26/19  1818 08/27/19  0613   TROP 0.067* 0.066*            Imaging: Imaging data reviewed in Epic.     Lucila Em

## 2019-08-29 NOTE — PROGRESS NOTES
Problem: Patient/Family Goals  Goal: Patient/Family Long Term Goal  Description  Patient's Long Term Goal: go home    Interventions:  - monitor labs  -IV lasix  - wean O2  - See additional Care Plan goals for specific interventions   Outcome: Progressing Manage/alleviate anxiety  - Monitor for signs/symptoms of CO2 retention  Outcome: Progressing     Problem: METABOLIC/FLUID AND ELECTROLYTES - ADULT  Goal: Electrolytes maintained within normal limits  Description  INTERVENTIONS:  - Monitor labs and rhythm

## 2019-08-29 NOTE — PROGRESS NOTES
[facility]  Cardiology Progress Note    Subjective:  No chest pain or shortness of breath. Does not quite feel ready for discharge today.       Objective:  /54 (BP Location: Left arm)   Pulse 64   Temp 97.6 °F (36.4 °C) (Oral)   Resp 18   Ht 73\ 2.24  · HTN  · Hx of DVT/PE  · CKD stage 4 - diuretics per Dr. Reva Jacobsen  · ABDIRAHMAN, intolerant to CPAP    Plan:    - IV diuretics per Dr. Reva Jacobsen  - Noted ok to resume ACE upon discharge per nephrology  - Resume ACE as above on d/c.   Hydralazine new here, prior to ad

## 2019-08-29 NOTE — PROGRESS NOTES
[facility]  Nephrology Progress Note    Afshan Dasilva Attending:  Kristeen Hashimoto, MD       Assessment and Plan:    1) CKD 4- baseline Cr > 2 mg/dl due to hypertensive nephropathy +/- cardiorenal syndrome; previous eval for other etiologies unrevealin 08/29/2019    BUN 54 08/29/2019     08/29/2019    K 4.1 08/29/2019     08/29/2019    CO2 30.0 08/29/2019     08/29/2019    CA 8.8 08/29/2019    INR 2.24 08/29/2019    PTP 26.2 08/29/2019       Imaging: All imaging studies reviewed.     Dipak Rico

## 2019-08-30 PROCEDURE — 99232 SBSQ HOSP IP/OBS MODERATE 35: CPT | Performed by: NURSE PRACTITIONER

## 2019-08-30 NOTE — PROGRESS NOTES
[facility] Dosing Service  Warfarin (Coumadin) Subsequent Dosing    Kathy Garcia is a 68year old male for whom pharmacy has been dosing warfarin (Coumadin).  Goal INR is 2-3    Recent Labs   Lab 08/26/19  1818 08/27/19  0749 08/28/19  2732 08/29/19  4770

## 2019-08-30 NOTE — PROGRESS NOTES
A&ox4, RA, refused cpap  A-paced w/ freq PVC's, go to switch to po diuretics today, daily wt  Voids, up ad shayan  D/c planning today

## 2019-08-30 NOTE — PROGRESS NOTES
[facility]  Cardiology Progress Note    Mallory Burgess Patient Status:  Inpatient    1945 MRN [id number]   [facility] 2NE-A Attending Heber Grant MD   Hosp Day # 4 PCP None Pcp     Subjective:  Sitting up in chair, asking when h 200 mg Oral BID   • Isosorbide Mononitrate ER  60 mg Oral Daily   • Pantoprazole Sodium  20 mg Oral BID AC   • Alfuzosin HCl ER  10 mg Oral Nightly   • hydrALAzine HCl  25 mg Oral Q8H CHRISTOPHER   • Albuterol Sulfate HFA  2 puff Inhalation BID         Assessment:

## 2019-08-30 NOTE — PROGRESS NOTES
[facility]  Nephrology Progress Note    Mario Johnson Attending:  Lenny Aponte MD       Assessment and Plan:    1) CKD 4- baseline Cr > 2 mg/dl due to hypertensive nephropathy +/- cardiorenal syndrome; previous eval for other etiologies unrevealin 08/30/2019    GLU 86 08/30/2019    CA 9.0 08/30/2019    INR 2.46 08/30/2019    PTP 28.3 08/30/2019       Imaging: All imaging studies reviewed.     Meds:     Current Facility-Administered Medications:  Metoprolol Succinate ER (Toprol XL) 24 hr tab 25 mg 25

## 2019-08-30 NOTE — RESPIRATORY THERAPY NOTE
ABDIRAHMAN - Equipment Use Daily Summary:                  . Set Mode:                . Usage in hours:                . 90% Pressure (EPAP) level:                . 90% Insp. Pressure (IPAP): Neelam Harkins AHI:                .  Supplemental Oxygen:    LPM

## 2019-08-30 NOTE — PLAN OF CARE
Pt OK to go home per primary and consults. Follow up appts have been made. Pt verbalizes understanding of DC instructions. Pt is tolerating ambulation well. Sent with 1 set os scripts to send to [place] and 1 set sent to [facility] for 1st week.  Pt wheeled off

## 2019-08-30 NOTE — PLAN OF CARE
Received patient at 0730. Alert and Oriented x4. Tele Rhythm Apaced. O2 saturation 96% On room air. Breath sounds diminished but clear. Bed is locked and in low position. Call light and personal items within reach. Pt denies pain ir discomfort at present. replacement therapy as ordered  Outcome: Progressing     Problem: RESPIRATORY - ADULT  Goal: Achieves optimal ventilation and oxygenation  Description  INTERVENTIONS:  - Assess for changes in respiratory status  - Assess for changes in mentation and behavi

## 2019-08-30 NOTE — PLAN OF CARE
Pt AOx4. On RA, denies SOB. IV lasix TID with good urine output. Apaced on tele. On coumadin. Pt denies pain. Plan for labs and daily weight in am. Pt updated on plan of care, will continue to monitor.    Problem: Patient/Family Goals  Goal: Patient/Family facilitate oxygenation and minimize respiratory effort  - Oxygen supplementation based on oxygen saturation or ABGs  - Provide Smoking Cessation handout, if applicable  - Encourage broncho-pulmonary hygiene including cough, deep breathe, Incentive Spiromet

## 2019-08-31 NOTE — DISCHARGE SUMMARY
[facility] [place] HOSPITALIST  DISCHARGE SUMMARY     Jolie Soliz Patient Status:  Inpatient    1945 MRN [id number]   [facility] 2NE-A Attending No att. providers found   Hosp Day # 4 PCP None Pcp     Date of Admission: 2019  Date of Discha 4/2019 and advised to follow up with his cardiologist at the [facility] which he has. He is compliant with his meds. Currently HD stable. Patient is going to received lasix IV now. Cardiologist has been notified.      Brief Synopsis: pt diuresed for acute CHF exa Tabs  Commonly known as:  LIPITOR      Take 80 mg by mouth daily. Refills:  0     Cholecalciferol 2000 units Caps      Take 2,000 Units by mouth daily. Refills:  0     colchicine 0.6 MG Tabs      Take 0.6 mg by mouth as needed.    Refills:  0     docusa MG Tabs  Commonly known as:  COREG        metoprolol Tartrate 25 MG Tabs  Commonly known as:  LOPRESSOR              Where to Get Your Medications      These medications were sent to [facility] [address] [facility], [address] [address]

## 2019-09-03 NOTE — PROGRESS NOTES
Initial Post Discharge Follow Up   Discharge Date: 8/30/19  Contact Date: 9/3/2019    Consent Verification:  Assessment Completed With: Patient  HIPAA Verified?   Yes    Discharge Dx:    Acute on chronic systolic CHF  HX: chronic troponin elevation, CKD Take 1 tablet (25 mg total) by mouth every 8 (eight) hours. Disp: 90 tablet Rfl: 1   furosemide 40 MG Oral Tab Take 2 tablets (80 mg total) by mouth 2 (two) times daily.  Per pt, it depends on how I feel Disp: 120 tablet Rfl: 11   Metoprolol Succinate ER 25 mouth nightly. Disp:  Rfl:    Isosorbide Mononitrate ER 60 MG Oral Tablet 24 Hr Take 60 mg by mouth daily. Disp:  Rfl:    gabapentin 100 MG Oral Cap Take 200 mg by mouth 2 (two) times daily.    Disp:  Rfl:      • When you were leaving the hospital were an before your next visit with your PCP?  (DME, meds, disease concerns, Etc): No     Follow up appointments:      Your appointments     Date & Time Appointment Department [facility])    Sep 04, 2019 12:30 PM CDT Hospital Follow Up with ALEX Pyle

## 2019-09-05 PROBLEM — I47.2 NSVT (NONSUSTAINED VENTRICULAR TACHYCARDIA) (HCC): Status: ACTIVE | Noted: 2019-01-01

## 2019-09-05 PROBLEM — Z95.810 BIVENTRICULAR ICD (IMPLANTABLE CARDIOVERTER-DEFIBRILLATOR) IN PLACE: Status: ACTIVE | Noted: 2019-01-01

## 2019-09-05 PROBLEM — I48.0 PAF (PAROXYSMAL ATRIAL FIBRILLATION) (HCC): Status: ACTIVE | Noted: 2019-01-01

## 2019-09-12 PROBLEM — G47.33 OBSTRUCTIVE SLEEP APNEA SYNDROME: Status: ACTIVE | Noted: 2019-09-12

## 2019-09-12 PROBLEM — Z95.2 S/P AVR (AORTIC VALVE REPLACEMENT): Status: ACTIVE | Noted: 2019-09-12

## 2019-09-12 PROBLEM — I47.29 NSVT (NONSUSTAINED VENTRICULAR TACHYCARDIA) (CMD): Status: ACTIVE | Noted: 2019-09-12

## 2019-09-12 PROBLEM — I10 ESSENTIAL HYPERTENSION: Status: ACTIVE | Noted: 2019-09-12

## 2019-09-12 PROBLEM — I25.10 CORONARY ARTERY DISEASE INVOLVING NATIVE CORONARY ARTERY OF NATIVE HEART WITHOUT ANGINA PECTORIS: Status: ACTIVE | Noted: 2019-09-12

## 2019-09-12 PROBLEM — Z86.718 HISTORY OF DVT (DEEP VEIN THROMBOSIS): Status: ACTIVE | Noted: 2019-09-12

## 2019-09-12 PROBLEM — Z95.2 S/P MVR (MITRAL VALVE REPLACEMENT): Status: ACTIVE | Noted: 2019-09-12

## 2019-09-12 PROBLEM — Z95.810 ICD (IMPLANTABLE CARDIOVERTER-DEFIBRILLATOR) IN PLACE: Status: ACTIVE | Noted: 2019-09-12

## 2019-09-12 PROBLEM — I48.0 PAF (PAROXYSMAL ATRIAL FIBRILLATION) (CMD): Status: ACTIVE | Noted: 2019-09-12

## 2019-09-12 PROBLEM — Z86.711 HISTORY OF PULMONARY EMBOLISM: Status: ACTIVE | Noted: 2019-09-12

## 2019-09-12 PROBLEM — N18.4 CKD (CHRONIC KIDNEY DISEASE) STAGE 4, GFR 15-29 ML/MIN (CMD): Status: ACTIVE | Noted: 2019-09-12

## 2019-09-12 PROBLEM — I50.20 SYSTOLIC HEART FAILURE (CMD): Status: ACTIVE | Noted: 2019-09-12

## 2019-09-12 PROBLEM — Z95.1 HX OF CABG: Status: ACTIVE | Noted: 2019-09-12

## 2019-11-25 PROBLEM — M54.2 NECK PAIN: Status: ACTIVE | Noted: 2019-01-01

## 2019-11-26 PROBLEM — N28.9 RENAL INSUFFICIENCY: Status: ACTIVE | Noted: 2019-01-01

## 2019-11-26 PROBLEM — R53.1 WEAKNESS GENERALIZED: Status: ACTIVE | Noted: 2019-01-01

## 2019-11-26 PROCEDURE — 99214 OFFICE O/P EST MOD 30 MIN: CPT | Performed by: INTERNAL MEDICINE

## 2019-11-26 NOTE — ED INITIAL ASSESSMENT (HPI)
70 y/o male to ED with c/o of weakness and pain from waist up. Patient reports pain started a few days ago and worsened today where it was hard for patient to dress self.

## 2019-11-26 NOTE — PROGRESS NOTES
[facility] Pharmacy Note:  Renal Dose Adjustment for Tramadol Mary Tabares    Varsha Rebollar has been prescribed Tramadol (ULTRAM) 50 mg orally every 6 hours. Estimated Creatinine Clearance: 24.9 mL/min (A) (based on SCr of 2.99 mg/dL (H)).     His calculated creatin

## 2019-11-26 NOTE — PROGRESS NOTES
[facility]PITAL[facility]  Progress Note     Georganne Flow Patient Status:  Observation    1945 MRN [id number]   [facility] 2NE-A Attending Tonya Mendez MD   Hosp Day # 0 PCP No primary care provider on file.      Chief Complaint: Mild CHF, TSH 0.610 11/26/2019    T4F 1.0 11/26/2019       Imaging: Imaging data reviewed in Epic.     Medications:   • allopurinol  100 mg Oral BID   • atorvastatin  80 mg Oral Daily   • finasteride  5 mg Oral Nightly   • Fluticasone Propionate  2 spray Each Nare Tramadol as no effective, add Medrol pack, Flexeril and PRN Norco  14. Urinary retention, UA neg  1. Alfuzosin  2. Proscar  15. Chronic kidney disease  16. Hyponatremia, improving  1. Monitor UOP, creatinine and electrolytes  17.  History of VTE  18. Leukoc

## 2019-11-26 NOTE — H&P
[facility]ITAL[facility]  History and Physical     Igor Brizuela Patient Status:  Observation    1945 MRN [id number]   [facility] 2NE-A Attending Naomy Barajas [address] Day # 0 PCP No primary care provider on file.      Chief Comp [employer]                 ANAPHYLAXIS    Medications:  No current facility-administered medications on file prior to encounter. hydrALAzine HCl 25 MG Oral Tab, Take 1 tablet (25 mg total) by mouth every 8 (eight) hours. , Disp: 90 tablet, Rfl: 1  furosemi mg by mouth 2 (two) times daily.   , Disp: , Rfl:   nitroGLYCERIN 0.4 MG Sublingual SL Tab, Place 0.4 mg under the tongue every 5 (five) minutes as needed for Chest pain., Disp: , Rfl:         Review of Systems:   A comprehensive 14 point review of systems consistent with severe arthritis. Will start on tramadol. 2. Acute on chronic systolic heart failure-continue on IV diuretics. EF is 25 to 30% on last echo done in April. will consult cardiology.   3. Urine retention-likely due to alpha-blocker and finas

## 2019-11-26 NOTE — PLAN OF CARE
Assumed patient care at 0730 this AM. Patient A&O x4, resting in bed. SPO2 maintained >90% on RA, no c/o SOB. Afib on tele, frequent PVC's. Run of 5 beat VT overnight, 4beat/5beat Vtach at 1018 this AM- MD aware, cardiology consulted.  Coumadin held today p

## 2019-11-26 NOTE — ED PROVIDER NOTES
Patient Seen in: [facility] Emergency Department      History   Patient presents with:  Fatigue (constitutional, neurologic)    Stated Complaint: weakness    HPI    60-year-old male comes to the hospital complaint having difficulty with feeling weak weakness  Other systems are as noted in HPI. Constitutional and vital signs reviewed. All other systems reviewed and negative except as noted above.     Physical Exam     ED Triage Vitals [11/25/19 2014]   /63   Pulse 86   Resp 22   Temp 97.8 °F limits   CBC W/ DIFFERENTIAL - Abnormal; Notable for the following components:    WBC 12.5 (*)     RBC 3.73 (*)     HGB 11.1 (*)     HCT 34.2 (*)     RDW 18.0 (*)     RDW-SD 60.5 (*)     Neutrophil Absolute Prelim 11.52 (*)     Neutrophil Absolute 11.52 Benjamin Ellsworth SINUSES:           No sign of acute sinusitis. Partial opacification involves the left sphenoid sinus. MASTOIDS:          No sign of acute inflammation. SKULL:             No evidence for fracture or osseous abnormality.   OTHER:             Atheroscler with degenerative change. Central canal stenosis cannot be excluded. Facet joint arthropathy. CONCLUSION:  1. Diffuse degenerative change as detailed above.    Dictated by: Pola Rose MD on 11/25/2019 at 22:06     Approved by: Pola Rose MD on 11/25 hospital for further management at this time.         MDM     As above  Admission disposition: 11/25/2019 11:21 PM                   Disposition and Plan     Clinical Impression:  Neck pain  (primary encounter diagnosis)  Elevated troponin  Renal insufficie

## 2019-11-26 NOTE — PROGRESS NOTES
[facility] Dosing Service  Warfarin (Coumadin) Initial Dosing    Joshua Godwin is a 69year old male for whom pharmacy has been consulted to dose warfarin (COUMADIN) for atrial fibrillation by Dr. Hoang Nelson. Based on this indication, goal INR is 2-3.

## 2019-11-26 NOTE — RESPIRATORY THERAPY NOTE
ABDIRAHMAN - Equipment Use Daily Summary:                  . Set Mode: AUTO CPAP WITH C-FLEX                . Usage in hours: 1:48                . 90% Pressure (EPAP) level: 8.8                . 90% Insp. Pressure (IPAP): Marielos Gil  AHI: 13.8

## 2019-11-26 NOTE — CM/SW NOTE
11/26/19 1600   CM/SW Referral Data   Referral Source Physician   Reason for Referral Discharge planning   Informant Patient   Patient Info   Patient's Mental Status Alert;Oriented   Patient's [address]   Patient lives with Alone   Patient

## 2019-11-26 NOTE — ED NOTES
Patient unable to urinate at this time. Reports he is continent and will let me know when he has to provide sample.

## 2019-11-26 NOTE — PLAN OF CARE
NURSING ADMISSION NOTE      Patient admitted via Cart  Oriented to room. Safety precautions initiated. Bed in low position. Call light in reach. Pt AOx4. RA, cpap overnight, pt states he will try to wear our mask. Afib w/BBB on tele.   Pt report

## 2019-11-27 PROCEDURE — 99214 OFFICE O/P EST MOD 30 MIN: CPT | Performed by: INTERNAL MEDICINE

## 2019-11-27 NOTE — RESPIRATORY THERAPY NOTE
ABDIRAHMAN - Equipment Use Daily Summary:                  . Set Mode:                . Usage in hours:                . 90% Pressure (EPAP) level:                . 90% Insp. Pressure (IPAP): Jonna Bates AHI:                .  Supplemental Oxygen:    LPM

## 2019-11-27 NOTE — PROGRESS NOTES
[facility]  Cardiology Progress Note    Subjective:  No chest pain or shortness of breath.     Objective:  /60 (BP Location: Right arm)   Pulse 69   Temp 97.9 °F (36.6 °C) (Oral)   Resp 18   Ht 6' 1\" (1.854 m)   Wt 235 lb 14.3 oz (107 kg)   SpO2 few PVC's, 4 beats WCT c/w NSVT this am, otherwise no recurrent WCT since ~1744 yesterday. Assessment:    · Acute on Chronic systolic heart failure due to ICM, LVEF 25% - diuresed well with IV lasix, transitioned to PO.  Weight down ~5lbs, net volume los to transition to p.o. diuretics  -Continue Imdur/hydralazine combo given his CKD     CAD status post CABG:  -Currently asymptomatic   -Vision should probably be on aspirin unless there is a contraindication  -Continue high intensity statin    Okay for disc

## 2019-11-27 NOTE — CONSULTS
[facility] Cardiology Consult Note    Joshua Godwin Patient Status:  Observation    1945 MRN [id number]   [facility] 2NE-A Attending America Anderson MD   Hosp Day # 0 PCP No primary care provider on file.        HPI:  69year old male with Diagnosis Date   • Atherosclerosis of coronary artery    • Congestive heart disease ([facility]    • Coronary atherosclerosis    • Deep vein thrombosis (HCC)    • Depression    • Gout    • Heart attack ([facility]    • Heart valve disease    • High blood pressure 11/30/2019] methylPREDNISolone  4 mg Oral BID   • [START ON 12/1/2019] methylPREDNISolone  4 mg Oral Daily with breakfast   • Metoprolol Succinate ER  50 mg Oral 2x Daily(Beta Blocker)         Physical Exam:   /57 (BP Location: Left arm)   Pulse 76

## 2019-11-27 NOTE — PLAN OF CARE
Assumed patient care at 0730 this AM. Patient A&O x4, resting in bed. SPO2 maintained >95% on RA, no c/o SOB. Afib on tele, frequent PVC's. INR 2.88 this AM, pharmacy to dose Coumadin. Cardiac rehab at bedside for HF education.  IV lasix continued per order

## 2019-11-27 NOTE — CONSULTS
Pharmacy Dosing Service: Warfarin (Coumadin)  Kathy Garcia is a 69year old male for whom pharmacy has been dosing warfarin (Coumadin).  Goal INR is 2-3    Recent Labs   Lab 11/25/19 2111 11/26/19  0614 11/27/19  0609   INR 3.67* 3.44* 2.88*     Potential

## 2019-11-27 NOTE — PLAN OF CARE
Assumed care of pt @ 1930. AOx4. On RA, denies SOB. Afib on tele, rates controlled. Coumadin on hold d/t supratherapeutic INR. Pt c/o neck pain, PRN norco given with some relief.  Pt reports no BM for about a week, refusing colace, reports that's normal for

## 2019-11-27 NOTE — OCCUPATIONAL THERAPY NOTE
OCCUPATIONAL THERAPY QUICK EVALUATION - INPATIENT    Room Number: 2677/1390-P  Evaluation Date: 11/27/2019     Type of Evaluation: Initial and Quick Eval  Presenting Problem: Neck and shoulder pain    Physician Order: IP Consult to Occupational Therapy  Re OCCUPATIONAL PROFILE    HOME SITUATION  Type of Home: House     Lives With: Alone    Toilet and Equipment: Comfort height toilet  Shower/Tub and Equipment: Walk-in shower       Occupation/Status: owns repo business  Hand Dominance: Left  Drives:  Yes ASSESSMENT  Supine to Sit : Independent  Sit to Stand: Modified independent    Skilled Therapy Provided: Pt seen semi supine. Independent to EOB. Completed UE testing. No weakness/impaired sensation noted at time of evaluation.  R shoulder AROM limited by p functional limitation presents during this admission.     Patient was able to achieve the following goals:  Patient able to toilet transfer: safely and independently  Patient able to dress lower extremities: safely and independently  Patient/Caregiver able

## 2019-11-27 NOTE — DISCHARGE SUMMARY
[facility]OSPITAL[facility]  DISCHARGE SUMMARY     Varsha Rebollar Patient Status:  Observation    1945 MRN [id number]   [facility] 2NE-A Attending Army Messi MD   Hosp Day # 0 PCP No primary care provider on file.      Date of Admission:  cardiology d/t mild heart failure which improved with IV diuretics. Patient did have NSVT for which beta blockers were increased. Patient with clinical improvement. He was discharged home in stable condition.      Lace+ Score: 73  59-90 High Risk  29-58 Med Tabs      Take 0.6 mg by mouth as needed. Refills:  0     docusate sodium 100 MG Caps  Commonly known as:  COLACE      Take 100 mg by mouth 2 (two) times daily.    Refills:  0     finasteride 5 MG Tabs  Commonly known as:  PROSCAR      Take 5 mg by mouth [address]  [place], [phone], [address], [address] [id number]    Phone:  [phone]   · Cyclobenzaprine HCl 5 MG Tabs  · HYDROcodone-acetaminophen 5-325 MG Tabs  · methylPREDNISolone 4 MG Tbpk  · Meto

## 2019-11-27 NOTE — DIETARY NOTE
[facility]     Admitting diagnosis:  Neck pain [M54.2]  Weakness generalized [R53.1]  Renal insufficiency [N28.9]  Elevated troponin [R79.89]  Acute on chronic congestive heart failure, unspecified heart failure ty

## 2019-11-27 NOTE — PHYSICAL THERAPY NOTE
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       SUBJECTIVE  \"I couldn't even get dressed, my arms were so weak\"    OBJECTIVE  Precautions: Bed/chair alarm  Fall R Functional Limits          Skilled Therapy Provided: Pt completed supine to sit w/ modified independence. Pt noted dizziness upon sitting, pt given time to adjust to position change and dizziness improved.       Pt educated on proper hand placement and com

## 2019-11-27 NOTE — PROGRESS NOTES
Explained discharge instructions including medications and follow ups to the patient, verbalize understanding, PIV removed, tele monitor discontinued, will be transported via wheelchair to [employer].

## 2019-11-27 NOTE — PLAN OF CARE
Pt is alert x 4, on Ra, A paced on the monitor with underline Afib, PT denies any cardiovascular symptoms at this time. Pt is up with SBA, continent of B/B. All needs met and will continue to monitor.      Pt has a fall at 2315: CT brain/head ordered STAT,

## 2019-11-29 NOTE — PROGRESS NOTES
Attempted to contact the pt to complete TCM call. There was no answer and was unable to leave a message.

## 2019-11-30 PROBLEM — E86.0 DEHYDRATION: Status: ACTIVE | Noted: 2019-01-01

## 2019-11-30 PROBLEM — R68.89 ALTERATION IN SELF-CARE ABILITY: Status: ACTIVE | Noted: 2019-01-01

## 2019-11-30 PROBLEM — M54.9 BACK PAIN: Status: ACTIVE | Noted: 2019-01-01

## 2019-11-30 NOTE — ED NOTES
Tino Lanier, family friend, can be reached at [phone] or [phone]    Patient has given permission for us to talk with her. She states she is concerned patient is depressed. MD rosa and mynor made aware.

## 2019-11-30 NOTE — ED PROVIDER NOTES
Patient Seen in: [facility] Emergency Department      History   Patient presents with:  Back Pain (musculoskeletal)    Stated Complaint: back and leg pain, pt limited in answering questions    HPI    79-year-old male comes to the hospital complaint rebound, guarding or masses noted  Back nontender without CVA tenderness  Extremity no clubbing, cyanosis or edema noted.   Full range of motion noted without tenderness  Neuro: No focal deficits noted    ED Course     Labs Reviewed   COMP METABOLIC PANEL ( (80651)    Result Date: 11/27/2019  PROCEDURE:  CT BRAIN OR HEAD (73464)  COMPARISON:  [facility] , CT, CT BRAIN OR HEAD (64874), 11/25/2019, 21:36. INDICATIONS:  Fall  TECHNIQUE:  Noncontrast CT scanning is performed through the brain.  Dose reduction techniq BRAIN OR HEAD (42334)  COMPARISON:  None. INDICATIONS:  weakness  TECHNIQUE:  Noncontrast CT scanning is performed through the brain. Dose reduction techniques were used.  Dose information is transmitted to the [facility] of Radiology) [address] ([employer]) NRDR ([address]) which includes the Dose Index Registry. PATIENT STATED HISTORY: (As transcribed by Technologist)  Patient presents with weakness and pain from waist up.     FINDINGS:  CRANIOCERVICAL AREA:  Norm 14:45          Xr Chest Ap Portable  (cpt=71045)    Result Date: 11/25/2019  PROCEDURE:  XR CHEST AP PORTABLE  (CPT=71045)  TECHNIQUE:  AP chest radiograph was obtained. COMPARISON:  [facility], XR CHEST AP PORTABLE  (CPT=71045), 8/26/2019, 18:44.   Zaria Cesar

## 2019-11-30 NOTE — ED NOTES
Patient easily falls asleep during interview. Reports he hasn't been sleeping due to pain and is exhausted.

## 2019-11-30 NOTE — ED INITIAL ASSESSMENT (HPI)
Patient presents for evaluation of back pain and left leg pain. Patient originally was only answering limited questions and stated to this RN that he felt \"incoherent\" unable to explain what he means by that when questioned.  States he took tramadol for p

## 2019-12-01 PROCEDURE — 99221 1ST HOSP IP/OBS SF/LOW 40: CPT | Performed by: INTERNAL MEDICINE

## 2019-12-01 PROCEDURE — 93289 INTERROG DEVICE EVAL HEART: CPT | Performed by: NURSE PRACTITIONER

## 2019-12-01 NOTE — CONSULTS
[facility] Dosing Service  Warfarin (Coumadin) Initial Dosing    Kiana Kim is a 69year old male for whom pharmacy has been consulted to dose warfarin (COUMADIN) for Prevention of systemic embolism by Dr. Quentin Mendez.   Based on this indication, goal INR is

## 2019-12-01 NOTE — PLAN OF CARE
Report received from Night RN. Bolus started, New IV started in left forearm. Labs drawn. Dr. Andrews Ryan aware of results. Plan to transfer to Tele bed. Report given to Malissa Hays RN. Bolus now completed, will transfer.

## 2019-12-01 NOTE — PROGRESS NOTES
Brief Note:    1. Septicemia (fever (new), leukocytosis and BC+ enterococcus) with complaints of back pain, has ICD as well  1. Check lactate, PCT, ESR, CRP   2. IVF 1L bolus - unable to do full sepsis bolus d/t chronic systolic heart failure EF 25-30%  3.

## 2019-12-01 NOTE — PROGRESS NOTES
[facility] Pharmacy Note:  Renal Dose Adjustment for Metoclopramide (REGLAN)    Faith Navarro has been prescribed Metoclopramide (REGLAN) 10 mg every 8 hours as needed for nausea. Estimated Creatinine Clearance: 23.5 mL/min (A) (based on SCr of 3.16 mg/dL (H)).

## 2019-12-01 NOTE — PHYSICAL THERAPY NOTE
Attempted to see pt. Chart reviewed. Pt t/f to CV unit this am, 12/1/2019, with rapid response/code stroke per hospitalist note. Pt will need new inpt PT orders when appropriate.

## 2019-12-01 NOTE — ICD/PM
Remote Cardiac Device Interrogation    Device : [employer]    Device type: dual lead ICD    Battery longevity: 8 year(s)    Lead impedance: normal  Sensing: 3.5 mV atrium  3.3 mv ventricle  Auto thresholds: < 7.5 @ .40 ms both A and V    < 1 % R

## 2019-12-01 NOTE — CONSULTS
[facility]  Neuro Critical Care Consult Note    Nehemiah Dixon Patient Status:  Observation    1945 MRN [id number]   [facility] 2NE-A Attending Lesley Jesus MD   Hosp Day # 0 PCP No primary care provider on file.      Date of Adm daily., Disp: , Rfl:   gabapentin 100 MG Oral Cap, Take 200 mg by mouth 2 (two) times daily.   , Disp: , Rfl:          Current Medications:    Current Facility-Administered Medications:   •  cefTRIAXone Sodium (ROCEPHIN) 2 g in sodium chloride 0.9% 100 mL M (180-139)/(5883) 105/58  Intake/Output:    Intake/Output Summary (Last 24 hours) at 12/1/2019 1215  Last data filed at 11/30/2019 2145  Gross per 24 hour   Intake —   Output 550 ml   Net -550 ml       General: Well nourished. Well developed.   Head: Normoc primary    Silvano Ambrose MD  Neurocritical care  [facility]  12/1/2019, 12:15 PM

## 2019-12-01 NOTE — SLP NOTE
Order received for swallow eval per stroke protocol. Code stroke called this am due to new onset word finding difficulties and slight facial droop. Arrived to room. Malcahi Tidwell RN from [facility] evaluating pt prior to transfer to [facility].   After discussion re: pts

## 2019-12-01 NOTE — PROGRESS NOTES
CTH:  CONCLUSION:  No change from prior examination from 4 days earlier. Chronic brain changes with atrophy and chronic white matter disease. No acute bleed. No CT features for acute territorial infarct.   Stable arachnoid cyst at the base of the skull,

## 2019-12-01 NOTE — PROGRESS NOTES
Report given to [facility] in CCU. Dr. Maciel Black aware of CT results and transfer.

## 2019-12-01 NOTE — PROGRESS NOTES
Pharmacy Dosing Service  Warfarin (Coumadin) Subsequent Dosing         Lennox Riggers is a 69year old male for whom pharmacy has been dosing warfarin.      Goal INR is 2-3    Recent Labs   Lab 11/25/19  2111 11/26/19  1636 11/27/19  0609 11/30/19  1334 12/

## 2019-12-01 NOTE — CONSULTS
INFECTIOUS DISEASE CONSULT NOTE    Husamchristinachilo Netta Patient Status:  Observation    1945 MRN [id number]   [facility] 6NE-A Attending Alexia Curling, MD   Hosp Day # 0 PCP No primary ANAPHYLAXIS    Medications:    Current Facility-Administered Medications:   •  cefTRIAXone Sodium (ROCEPHIN) 2 g in sodium chloride 0.9% 100 mL MBP/ADD-vantage, 2 g, Intravenous, Q12H  •  Warfarin Sodium (COUMADIN) tab 6 mg, 6 mg, Oral, Once at night  •  [ Moist mucous membranes. + conjunctival petechiae  Neck: No lymphadenopathy. Supple. Respiratory: Clear to auscultation bilaterally. No wheezes. Cardiovascular: seemed to have RRR, distant sounds. Sternal incision well healed.  No erythema but chest Result Value Ref Range    Blood Culture Result Pending (A) N/A    Blood Culture Smear Gram positive cocci in pairs and chains (A) N/A         Radiology: Ct Brain Or Head ([id number])    Result Date: 11/27/2019  PROCEDURE:  CT BRAIN OR HEAD (04370)  COMPARISON Andrade Ford MD on 11/27/2019 at 6:20     Approved by: Andrade Ford MD on 11/27/2019 at 6:24          Ct Brain Or Head ([id number])    Result Date: 11/25/2019  PROCEDURE:  CT BRAIN OR HEAD ([id number])  COMPARISON:  None.   INDICATIONS:  weakness  TECHNIQUE:  Non performed from the skull base through C7. Multiplanar reconstructions are generated. Dose reduction techniques were used.  Dose information is transmitted to the [facility] ([address] [place] [address] of Radiology) Rossy Ortiz [address] ([address]) which includes th no sign of CHF, pneumonia, effusion, edema, or other acute process.    Dictated by: Tere Waller MD on 11/30/2019 at 14:44     Approved by: Tere Waller MD on 11/30/2019 at 14:45          Xr Chest Ap Portable  (cpt=71045)    Result Date: 11/25/2019  P no abnormal extraaxial fluid collections. There is no midline shift. There are no acute appearing intraparenchymal brain abnormalities. There is nothing specific for acute territorial infarct. There is no hemorrhage or mass lesion.   There is chronic mi and venous structures were selected for purposes of brain perfusion mapping. All measurements obtained in this exam were performed using NASCET criteria. Dose reduction techniques were used.  Dose information is transmitted to the [facility] [employer] of parietal lobes. Elevated relative cerebral blood  volume noted in these regions, but the perfusion index most abnormal is the mean transit time.   Critical value result, nurse Hannah notified of the findings by telephone at [phone] hours today, 12/1/2019, with to call if you have any questions. I will continue to follow with you and will make further recommendations based on his progress.     Ines Reyes Klippel  12/1/2019  2:32 PM

## 2019-12-01 NOTE — PLAN OF CARE
NURSING ADMISSION NOTE      Patient admitted via Cart  Oriented to room. Safety precautions initiated. Bed in low position. Call light in reach. Pt arrived from ER. Alert and oriented X4, drowsy vitals stable, complained of neck, back, toe pain.  Shane Cramp

## 2019-12-01 NOTE — SIGNIFICANT EVENT
Called to [id number]/5493-R at 1003 for Rapid Response possible code stroke  Arrived to dept at 1007. Code stroke called 1011.   Upon arrival found patient with NIH of [id number] neither question correctly (2), 4-Minor right sided facial droop (1), 6r-Drift to

## 2019-12-01 NOTE — PROGRESS NOTES
[facility]  Progress Note     Verena Gomez Patient Status:  Observation    1945 MRN [id number]   [facility] 4NW-A Attending Alfredo Alejandro MD   Hosp Day # 0 PCP No primary care provider on file.      Chief Complaint: [address] [place] [address] 1.3   TP 7.5  --   --  7.1 6.9    < > = values in this interval not displayed. Estimated Creatinine Clearance: 23.8 mL/min (A) (based on SCr of 3.13 mg/dL (H)).     Recent Labs   Lab 11/27/19  0609 11/30/19  1334 12/01/19  0651   PTP 32.1* 24.0* 24.9* Coumadin  1. Toprol  2. Coumadin  3. Monitor INR  14. Chronic kidney disease  1. Monitor UOP, creatinine and electrolytes  15. Leukocytosis d/t steroids  1. Monitor  16. Depression  1. Psych consult on Monday  17.  History of VTE    Quality:  · DVT Prophyla

## 2019-12-01 NOTE — PROGRESS NOTES
Pharmacy Dosing Service  Initial Pharmacokinetic Consult for Vancomycin Dosing          Beacher Habermann is a 69year old male who is being treated for bacteremia. Pharmacy has been asked to dose vancomycin by Dr. Yana Rod. He is allergic to seafood. level 15-20 ug/mL. 3.  Will monitor SCr daily while on vancomycin to assess renal function. Pharmacy will continue to follow him and adjust dosing as appropriate.         Jovany Hdz, PharmD, BCPS  12/1/2019  11:07 AM  [facility] Extension: 95

## 2019-12-01 NOTE — H&P
[facility]  History and Physical     Isabellamariano Benito Patient Status:  Observation    1945 MRN [id number]   [facility] 4NW-A Attending Alex Catalan MD   Hosp Day # 0 PCP No primary care provider on file.      Chief Complaint: CARDIAC PACEMAKER PLACEMENT     • PACEMAKER MONITOR     • VALVE REPAIR         Social History:  reports that he quit smoking about 19 years ago. He has never used smokeless tobacco. He reports current alcohol use. He reports that he does not use drugs. 108 (90 Base) MCG/ACT Inhalation Aero Soln, Inhale 1 puff into the lungs 2 (two) times daily. , Disp: , Rfl:   Fluticasone Propionate 50 MCG/ACT Nasal Suspension, 2 sprays by Each Nare route daily. , Disp: , Rfl:   colchicine 0.6 MG Oral Tab, Take 0.6 mg by ALKPHO 78  --   --   --  81   AST 17  --   --   --  26   ALT 26  --   --   --  33   BILT 0.8  --   --   --  1.1   TP 7.5  --   --   --  7.1       Estimated Creatinine Clearance: 23.5 mL/min (A) (based on SCr of 3.16 mg/dL (H)).     Recent Labs   Lab 11/26

## 2019-12-01 NOTE — PROGRESS NOTES
12/01/19 1011   Clinical Encounter Type   Visited With Health care provider  ( services declined at this time.)   Continue Visiting No  (Not at this time.)

## 2019-12-01 NOTE — PROGRESS NOTES
[facility] Pharmacy Note: Antimicrobial Weight Based Dose Adjustment for: piperacillin/tazobactam (Dona Erickson)    Afshan Dasilva is a 69year old male who has been prescribed piperacillin/tazobactam (ZOSYN) 3.375 g every 8 hours.     Estimated Creatinine Clearance: 23.5

## 2019-12-01 NOTE — PLAN OF CARE
[employer] device interrogated. [employer] notified and informed them that we need to verify if device is MRI compatible. [employer] given call back number of Cardio APN.

## 2019-12-01 NOTE — PROGRESS NOTES
Received pt from med/onc awake unable to answer questions/ difficulty speaking. Difficulty finding words. Able to [address]. Able to follow finger movement. Notified Dr. Francesca Akins   Head to toe assessment done   Rapid response called.

## 2019-12-02 PROCEDURE — 93320 DOPPLER ECHO COMPLETE: CPT | Performed by: INTERNAL MEDICINE

## 2019-12-02 PROCEDURE — 99233 SBSQ HOSP IP/OBS HIGH 50: CPT | Performed by: INTERNAL MEDICINE

## 2019-12-02 PROCEDURE — 93312 ECHO TRANSESOPHAGEAL: CPT | Performed by: INTERNAL MEDICINE

## 2019-12-02 PROCEDURE — 99152 MOD SED SAME PHYS/QHP 5/>YRS: CPT | Performed by: INTERNAL MEDICINE

## 2019-12-02 PROCEDURE — 76376 3D RENDER W/INTRP POSTPROCES: CPT | Performed by: INTERNAL MEDICINE

## 2019-12-02 PROCEDURE — 93325 DOPPLER ECHO COLOR FLOW MAPG: CPT | Performed by: INTERNAL MEDICINE

## 2019-12-02 NOTE — PLAN OF CARE
Patient alert at times, follows all commands asked of him. Strengths equal bilaterally, arms stronger then legs. Nods head to yes/no questions and occasionally states yes or no. He is able to state his name and age.  When asked time questions, he was only a

## 2019-12-02 NOTE — PLAN OF CARE
Pt has been hemodynamically stable. Pt NPO for JASON this afternoon then needs swallow eval as per stroke protocol. B-blocker changed to IV re: npo status. Neurologically, pt with mod to severe expressive aphasia. Arms equil and strong, no ataxia or drift.

## 2019-12-02 NOTE — PHYSICAL THERAPY NOTE
Physical therapy consult requested per Stroke Protocol following transfer to CNICU. Attempted to see pt at this time but the pt is undergoing JASON. Pt has MRI of the brain and spine scheduled this afternoon.   Thus will plan to re-attempt to see pt again t

## 2019-12-02 NOTE — SLP NOTE
Orders received for bedside swallowing evaluation. Patient was NPO earlier in the day for a JASON. He was sent for JASON and then to MRI. Patient had not returned to the floor for this therapist to assess patient for swallowing evaluation.  Will attempt on 12/3

## 2019-12-02 NOTE — ICD/PM
Reviewed need for MRI with Dr Sachin Bowers. Per Kimberly Persley from [employer], MRI is technically off label because of the fact the LV port is plugged. All parts of the device are MRI conditional. Ok to proceed with MRI per Dr Sachin Bowers.     Patient is not pacer dependant

## 2019-12-02 NOTE — PROGRESS NOTES
[facility]  Progress Note     [facility] Patient Status:  Inpatient    1945 MRN [id number]   [facility] 6NE-A Attending Maude Prieto MD   Hosp Day # 1 PCP No primary care provider on file.      Chief Complaint: [address] [place] [address] = values in this interval not displayed. Estimated Creatinine Clearance: 25 mL/min (A) (based on SCr of 2.98 mg/dL (H)).   Recent Labs   Lab 11/30/19  1334 12/01/19  0651 12/02/19  0412   PTP 24.0* 24.9* 37.6*   INR 2.01* 2.10* 3.49*     Recent Labs   L consult  16.  History of VTE     Quality:  · DVT Prophylaxis: Coumadin  · CODE status: Full  · Ibarra: No  · Central line: No     Will the patient be referred to TCC on discharge?: Yes  Estimated date of discharge: TBD  Discharge is dependent on: Clinical co

## 2019-12-02 NOTE — PROGRESS NOTES
[address] [facility]  Neurocritical Care       Subjective:  Romeo Husbands is a(n) 69year old male s/p code stroek with word finding difficulty, CTA showed dominant right vertebral artery with open basilar and abnormal MTT on posterior fossa on per CT BRAIN OR HEAD ([id number]), 11/25/2019, 21:36. INDICATIONS:  Fall  TECHNIQUE:  Noncontrast CT scanning is performed through the brain. Dose reduction techniques were used.  Dose information is transmitted to the [facility] of Radiology) Rossy Ortiz [address] [place] [address] scanning is performed through the brain. Dose reduction techniques were used. Dose information is transmitted to the  [facility] of Radiology) NR[facility] ([address]) which includes the Dose Index Registry.   PATIENT STATED HISTORY Registry. PATIENT STATED HISTORY: (As transcribed by Technologist)  Patient presents with weakness and pain from waist up. FINDINGS:  CRANIOCERVICAL AREA:  Normal foramen magnum with no Chiari malformation.  PARASPINAL AREA:  Normal with no visible mass noncalcified slightly lobulated posterior left upper lobe image 23 measuring 5.7 x 4.3 mm. Small right pleural effusion and trace left pleural effusion. Mild bilateral posterior lower lobe atelectasis. Bronchial wall thickening present throughout.   No b shadow is enlarged. No evidence for acute CHF. The lungs are clear. The costophrenic angles are sharp. There is no active disease seen. Stable pacemaker. CONCLUSION:  The cardiac shadow is enlarged.  However, there is no sign of CHF, pneumonia, eff HISTORY:(As transcribed by Technologist)  Patient is having difficulty speaking since last night. CONTRAST USED:   FINDINGS:   VENTRICLES/SULCI:   Ventricles and sulci are prominent indicating atrophy.   INTRACRANIAL:  There are no abnormal extraaxial flu performed. 3D volume renderings are performed by the radiologist on an independent work station and interpreted  to optimize visualization of vascular anatomy. PERFUSION:  Axial sections were obtained per stroke protocol.   Arterial and venous structures basilar artery is patent. Abnormal perfusion study showing elevated mean transit time involving posterior fossa structures of cerebellum, sandra, but also of portions of the posterior cerebrum involving occipital lobes and posterior parietal lobes.   Elevate 53*   < > 67* 72* 72*   CREATSERUM 2.99*   < > 3.16* 3.13* 2.98*   GFRAA 23*   < > 21* 22* 23*   GFRNAA 20*   < > 18* 19* 20*   CA 8.2*   < > 8.5 8.3* 8.3*   ALB 2.4*  --  2.2* 2.2*  --    *   < > 134* 135* 139   K 3.2*   < > 4.1 4.1 4.2      < 50 mg, Oral, Q12H PRN  lidocaine-menthol 4-1 % 1 patch, 1 patch, Transdermal, Daily  ondansetron HCl (ZOFRAN) injection 4 mg, 4 mg, Intravenous, Q6H PRN        Assesment/Plan:   Principal Problem:    Neck pain  Active Problems:    Alteration in self-care a

## 2019-12-02 NOTE — PROGRESS NOTES
12/02/19 1543   Clinical Encounter Type   Visited With Patient   Routine Visit Introduction  (Sushil Trejo visited with pt providing prayer, Scripture and SOS)   Continue Visiting No

## 2019-12-02 NOTE — PLAN OF CARE
Assumed care from [address] [place] [address], pt is aphasic but with nod head yes/no appropriately to questions, he is able to follow commands and move extremities with equal strength. Pt taken to CT for chest/abd/pelvic scan, awaiting MRI, screening completed.

## 2019-12-02 NOTE — PROGRESS NOTES
[facility]                INFECTIOUS DISEASE PROGRESS NOTE    Deysi Standing Patient Status:  Inpatient    1945 MRN [id number]   [facility] 6NE-A Attending Diego Orr MD   Hosp Day # 1 PCP No primary care provider on file results found for: [facility]  Microbiology  Blood Culture FREQ X 2 [[id number]] (Abnormal) Collected: 11/30/19 1431   Order Status: Completed Lab Status: Preliminary result Updated: 12/02/19 0912   Specimen: Blood,peripheral     Blood Culture Result Gram positi of effort [facility])     Atrial fibrillation ([facility] [address]     Hyperglycemia     Acute on chronic congestive heart failure (HCC)     Acute on chronic congestive heart failure, unspecified heart failure type (HCC)     Hypervolemia, unspecified hypervolemia type     Elev

## 2019-12-02 NOTE — DIETARY NOTE
[facility]    NUTRITION ASSESSMENT    Pt does not meet malnutrition criteria. NUTRITION DIAGNOSIS/PROBLEM:    Inadequate oral intake related to inability to consume sufficient intake as evidenced by NPO status    NUTRITION INTERVENTION:       1.   C kg/IBW)  Fluid: ~1 ml/kcal or per MD discretion    MONITOR AND EVALUATE/NUTRITION GOALS:    1. PO or EN within 24h    MEDICATIONS:  Abx, Colace, Protonix, Senna,     LABS:  BUN 72; Cr 2.98    Pt is at high nutrition risk    FOLLOW-UP DATE: 12/5    Jacob Romero

## 2019-12-02 NOTE — PROCEDURES
Cardiology 3D Transesophageal Echo Note    PRE-PROCEDURE DIAGNOSIS:  Enterococcal bacteremia, history of mitral valve replacement with bioprosthesis    PROCEDURE: Transesophageal Echocardiogram.    SEDATION:   Topical spray x 1  Versed: 3 mg  Fentanyl: 50 mmHg.  · Velocities of 30-40 cm/s were seen in the KINA. There was no evidence for intracardiac mass or thrombus in the KINA. · There was no evidence for reversal of flow in the pulmonary veins.    · A color Doppler flow interrogation of the intra-atrial sep

## 2019-12-02 NOTE — OCCUPATIONAL THERAPY NOTE
Attempted to see pt for OT eval this morning; however spoke with RN who is requesting to try later d/t testing. Second attempt in the afternoon, but pt having JASON at this time followed by MRI. Will f/u again tomorrow as appropriate.   RN in agreement wit

## 2019-12-02 NOTE — CONSULTS
[facility]  Cardiology Consultation    [facility] Patient Status:  Inpatient    1945 MRN [id number]   [facility] 6NE-A Attending Dalila Ahumada, MD   Hosp Day # 0 PCP No primary care provider on file.      Reason for Consultatio HCl (VANCOCIN) 1,500 mg in sodium chloride 0.9% 500 mL IVPB, 15 mg/kg (Adjusted), Intravenous, Q24H  •  atorvastatin (LIPITOR) tab 80 mg, 80 mg, Oral, Nightly  •  [DISCONTINUED] aspirin 300 MG rectal suppository 300 mg, 300 mg, Rectal, Daily **OR** aspirin oz (107 kg)      Physical Exam:   General: patient is aphasic  HEENT: Normocephalic, neck supple  Neck: No JVD, carotids 2+, no bruits. Cardiac: Regular rate and rhythm.  S1, S2 with holosystolic murmur  Lungs: Clear without rales  Abdomen: Soft, non-tende prior  - continue BB for known ischemic CM (LVEF 20-25%)    Thank you for allowing me to participate in the care of your patient.     Asif Jackson MD  12/1/2019  7:18 PM

## 2019-12-02 NOTE — PROGRESS NOTES
[facility]  Progress Note    Hubbard Ismael Patient Status:  Inpatient    1945 MRN [id number]   [facility] 6NE-A Attending Rachel Umanzor MD   Hosp Day # 1 PCP No primary care provider on file.        Subjective:  Remains aphasic b Once   • cefTRIAXone  2 g Intravenous Q12H   • Warfarin Sodium  6 mg Oral Once at night   • vancomycin  15 mg/kg (Adjusted) Intravenous Q24H   • atorvastatin  80 mg Oral Nightly   • aspirin  325 mg Oral Daily   • Senna  17.2 mg Oral Nightly   • allopurinol (Temporal)   Resp 21   Ht 6' 1\" (1.854 m)   Wt 235 lb 0.2 oz (106.6 kg)   SpO2 95%   BMI 31.01 kg/m²      Gen: Alert and oriented  CV: RRR nl S1 S2  Pulm: CTA b/l  Ext: No CCE, 2+ distal pulse     Assessment and Plan:    79 year old male with PMHx sig for

## 2019-12-03 PROCEDURE — 99233 SBSQ HOSP IP/OBS HIGH 50: CPT | Performed by: INTERNAL MEDICINE

## 2019-12-03 NOTE — PLAN OF CARE
Patient more alert and speaking more today. Oriented to person and time, occasionally place and situation. Answers simple questions correctly and converses appropriately. Still with expressive aphasia.  Able to make needs and wants known, uses call light ap

## 2019-12-03 NOTE — PROGRESS NOTES
[facility]  Progress Note     Igor Brizuela Patient Status:  Inpatient    1945 MRN [id number]   [facility] 6NE-A Attending Bryan Ramirez MD   Hosp Day # 2 PCP No primary care provider on file.      Chief Complaint: [address] [place] [address] Creatinine Clearance: 26.6 mL/min (A) (based on SCr of 2.8 mg/dL (H)). Recent Labs   Lab 12/01/19  0651 12/02/19  0412 12/03/19  0432   PTP 24.9* 37.6* 18.6*   INR 2.10* 3.49* 1.47*     No results for input(s): TROP, CK in the last 168 hours.      Imaging: plts higer   Repeat blood cx today   Repeat vit K per neuro   Start diet today  Pt/ot to see  Add po norco  X ray left knee  LFT's added   Will need prolonged abx/ removal of ICD/ leads due to seeding on RV lead.  Per cards   LVM for [place]arlene  On nam There is lack of the normal flow void involving the right vertebral artery which was occluded on the recent CTA.     Quality:  · DVT Prophylaxis: Coumadin  · CODE status: Full  · Ibarra: No  · Central line: No     Will the patient be referred to TCC on disch

## 2019-12-03 NOTE — SLP NOTE
ADULT SWALLOWING EVALUATION    ASSESSMENT    ASSESSMENT/OVERALL IMPRESSION:  Pt seen at bedside this AM for bedside swallow evaluation. Speech consulted per CVA protocol. Pt cooperative, pleasant, and alert. MRI + scattered acute infarcts.  Pt failed RN dys atherosclerosis    • Deep vein thrombosis (HCC)    • Depression    • Gout    • Heart attack ([address]    • Heart valve disease    • High blood pressure    • High cholesterol    • Osteoarthritis    • Pulmonary embolism (HCC)    • Renal disorder    • Sleep apnea Quality: Clear  Respiratory Status: Unlabored  Consistencies Trialed: Thin liquids;Puree;Hard solid  Method of Presentation: Self presentation;Spoon;Cup;Single sips; Consecutive swallows  Patient Positioning: Upright    Oral Phase of Swallow:  Within [facility]

## 2019-12-03 NOTE — PHYSICAL THERAPY NOTE
PHYSICAL THERAPY EVALUATION - INPATIENT     Room Number: 2502/8765-M  Evaluation Date: 12/3/2019  Type of Evaluation: Initial  Physician Order: PT Eval and Treat    Presenting Problem: Neck Pain  Reason for Therapy: Mobility Dysfunction and Discharge Estela which is moderate causing mild to moderate central canal stenosis at C4-5, C5-6, and C6-7.   2. No significant minimal degenerative disc disease within the thoracic spine without significant central or foraminal stenosis.     3. L2-3 left posterior paracent None  Patient Regularly Uses: Reading glasses    Prior Level of [place]: The pt is unable to provide history. All history obtained from the pt's girlfriend and Staci Salvage.   The pt is typically independent with ambulation and I/ADL's.    SUBJECTIVE Turning over in bed (including adjusting bedclothes, sheets and blankets)?: A Little   -   Sitting down on and standing up from a chair with arms (e.g., wheelchair, bedside commode, etc.): Unable   -   Moving from lying on back to sitting on the side of t on 11/30/2019 for neck pain. Pertinent comorbidities and personal factors impacting therapy include bilateral brain infarcts, septicemia, endocarditis, and depression.   In this PT evaluation, the patient presents with the following impairments: 1) aphasia device: walker - rolling at assistance level: minimum assistance     Goal #4    Goal #5    Goal #6    Goal Comments: Goals established on 12/3/2019

## 2019-12-03 NOTE — PROGRESS NOTES
Sarahi Nunn [facility] care  Called me back this afternoon    Lists his PCP as Nury Tanner at [facility]  PHONE [phone]  Caitlin Wellington- [phone]    JAYCE is [facility] Sender     Updated given  Julian Coffey NP

## 2019-12-03 NOTE — PROGRESS NOTES
[facility]  Progress Note    Jamalvern Cooper Patient Status:  Inpatient    1945 MRN [id number]   [facility] 6NE-A Attending Ren Velez MD   Hosp Day # 2 PCP No primary care provider on file.        Subjective:  Aphasia present b ALKPHO 84 12/03/2019    BILT 0.7 12/03/2019    TP 7.4 12/03/2019    AST 38 12/03/2019    ALT 38 12/03/2019    PTT 38.2 12/03/2019    INR 1.47 12/03/2019    MG 2.7 12/03/2019    PHOS 4.3 12/03/2019         Medications:    • metoprolol Tartrate  5 mg Lex John pending  · Transition back to po meds     Patricia Daniels NP  12/3/2019  11:44 AM  [phone]    Patient seen and examined. Agree with above note and assessment. HEENT- NCAT,   CVS- normal S1, S2  Lungs- clear bilaterally.    · ICD leads implanted 2016 (as p

## 2019-12-03 NOTE — OCCUPATIONAL THERAPY NOTE
OCCUPATIONAL THERAPY EVALUATION - INPATIENT     Room Number: 0637/4797-H  Evaluation Date: 12/3/2019  Type of Evaluation: Initial  Presenting Problem: CVA    Physician Order: IP Consult to Occupational Therapy  Reason for Therapy: ADL/IADL Dysfunction and cervical spine which is moderate causing mild to moderate central canal stenosis at C4-5, C5-6, and C6-7.   2. No significant minimal degenerative disc disease within the thoracic spine without significant central or foraminal stenosis.    3. L2-3 left pos toilet  Shower/Tub and Equipment: Walk-in shower  Other Equipment: None    Occupation/Status: retired  Hand Dominance: Right  Drives: Yes  Patient Regularly Uses: Reading glasses    Prior Level of Function: The pt is unable to provide history.   All history bedpan or urinal? : A Lot  -   Putting on and taking off regular upper body clothing?: A Lot  -   Taking care of personal grooming such as brushing teeth?: A Little  -   Eating meals?: None    AM-PAC Score:  Score: 15  Approx Degree of Impairment: 56.46% - 5 performance deficits   Client Assessment/Performance Deficits MODERATE - Comorbidities and min to mod modifications of tasks    Clinical Decision Making MODERATE - Analysis of occupational profile, detailed assessments, several treatment options    Ove

## 2019-12-03 NOTE — PROGRESS NOTES
[facility]  Neurocritical Care       Subjective:  Doug Benito is a(n) 69year old male s/p code stroek with word finding difficulty, CTA showed dominant right vertebral artery with open basilar and abnormal MTT on posterior fossa on per symmetrical. Tongue midline. Motor: No arm or leg weakness noted. Sensation: Respond to noxious stimuli in all 4  Gait not assessed.   Diagnostics:   Ct Brain Or Head (56795)    Result Date: 11/27/2019  PROCEDURE:  CT BRAIN OR HEAD ([id number])  COMPARISON: Veronica Thompson MD on 11/27/2019 at 6:20     Approved by: Veronica Thompson MD on 11/27/2019 at 6:24          Ct Brain Or Head ([id number])    Result Date: 11/25/2019  PROCEDURE:  CT BRAIN OR HEAD ([id number])  COMPARISON:  None.   INDICATIONS:  weakness  TECHNIQUE:  Non performed from the skull base through C7. Multiplanar reconstructions are generated. Dose reduction techniques were used.  Dose information is transmitted to the [facility] ([address] [place] [address] of Radiology) Rossy Ortiz [address] ([address]) which includes th performed without additional IV IV or oral contrast, with limitations thereof.   The patient did have a CT with contrast earlier for brain imaging, and there is some residual contrast in the collecting system of the kidneys and bladder  CHEST:   LUNGS/PLEUR HUGOWARD , XR, XR CHEST AP PORTABLE  (CPT=71045), 11/25/2019, 22:05. INDICATIONS:  back and leg pain, pt limited in answering questions  PATIENT STATED HISTORY: (As transcribed by Technologist)  Patient offered no additional history at this time.     FINDING Noncontrast CT scanning is performed through the brain. Dose reduction techniques were used. Dose information is transmitted to the [employer] of Radiology) [facility] ([address]) which includes the Dose Index Registry.   CONRADO GONZALEZ , CT, CT STROKE BRAIN (NO IV)(CPT=70450), 12/01/2019, 10:25.   INDICATIONS:  Stroke symptoms difficulty speaking  TECHNIQUE:  Unenhanced followed by contrast enhanced multislice CT angiography of the brain and neck vasculature using non-ionic contras right, the remainder of the right ICA, as well as the left ICA patent throughout to the terminus intracranially. Extensive arterial calcification involving the intracranial ICA bilaterally.   The anterior, middle, and  posterior cerebral arteries appear pa 92.3   MCH 29.7 30.3 29.2 29.4   MCHC 32.4 33.3 31.9 31.9   RDW 18.0* 18.0* 18.0* 18.1*   NEPRELIM 16.37* 14.13*  --  12.51*   WBC 17.9* 15.4* 13.7* 13.9*   .0* 137.0* 135.0* 155.0         Recent Labs   Lab 11/30/19  1334 12/01/19  0651 12/02/19  04 PRN  traMADol HCl (ULTRAM) tab 50 mg, 50 mg, Oral, Q12H PRN  lidocaine-menthol 4-1 % 1 patch, 1 patch, Transdermal, Daily  ondansetron HCl (ZOFRAN) injection 4 mg, 4 mg, Intravenous, Q6H PRN        Assesment/Plan:   Principal Problem:    Neck pain  Active Heme/ID:  - WBC 13.9, Endocarditis, Sepsis and bacteremia on Abx, ID on board. - Monitor H/H with goal hemoglobin more than 7gm/dl    Endocrine:  - Hyperglycemia protocol if req  Skin:  - Monitor for skin breakdown.   DVT Prophylaxis:  - SCD’s  A total

## 2019-12-03 NOTE — PROGRESS NOTES
[facility]                INFECTIOUS DISEASE PROGRESS NOTE    Romeo Husbands Patient Status:  Inpatient    1945 MRN [id number]   [facility] 6NE-A Attending Deni Abernathy MD   Hosp Day # 2 PCP No primary care provider on file Blood Culture Result Enterococcus faecalisAbnormal     Blood Culture Smear Gram positive cocci in pairs and chainsAbnormal    Susceptibility      Enterococcus faecalis     Not Specified    Ampicillin <=2  Sensitive    Gentamicin High Level  Sensitive1 Coronary artery disease involving coronary bypass graft of native heart with other forms of angina pectoris (HCC)     ABDIRAHMAN on CPAP     Stage 4 chronic kidney disease ([facility] [address]     Biventricular ICD (implantable cardioverter-defibrillator) in place     NSVT (

## 2019-12-03 NOTE — CM/SW NOTE
PT recommending Acute rehab. PMR order entered and sent to Raghav Taveras via [address]. SW will follow up for additional needs.     Alexsandra Mcfarland MSN RN, CTL/  P: [phone]

## 2019-12-03 NOTE — CONSULTS
Lonnie 22 Patient Status:  Inpatient    1945 MRN [id number]   [facility] 6NE-A Attending Enrique Vargas MD   Hosp Day # 2 PCP No primary care provider on file.      Patient Identification  Ranjeet Das is a 69 y diffusion in the bilateral cerebellar hemispheres, left occipital lobe, and the posterior left temporal lobe. These findings are compatible with areas of   acute ischemia/infarction.  Given the multiple vascular territories involved, a central embolic sour mass or thrombus in the KINA. · There was no evidence for reversal of flow in the pulmonary   veins. · A color Doppler flow interrogation of the intra-atrial septum   was performed and there was no intracardiac shunting to suggest   an ASD or PFO.   · The 5-325 MG per tab 1 tablet, 1 tablet, Oral, Q6H PRN  Metoprolol Succinate ER (Toprol XL) 24 hr tab 25 mg, 25 mg, Oral, 2x Daily(Beta Blocker)  [COMPLETED] benzocaine (HURRICAINE/TOPEX) 20 % mouth spray 1 spray, 1 spray, Mouth/Throat, Once  morphINE sulfate (PROSCAR) tab 5 mg, 5 mg, Oral, Nightly  Fluticasone Propionate (FLONASE) 50 MCG/ACT nasal spray 2 spray, 2 spray, Each Nare, Daily  nitroGLYCERIN (NITROSTAT) SL tab 0.4 mg, 0.4 mg, Sublingual, Q5 Min PRN  Pantoprazole Sodium (PROTONIX) EC tab 20 mg, 20 mg Summary (Last 24 hours) at 12/3/2019 1414  Last data filed at 12/3/2019 1200  Gross per 24 hour   Intake 2171 ml   Output 1200 ml   Net 971 ml       Physical Exam:                                      General: Alert, cooperative, no distress, appears state endocarditis    Aphasia/gait ataxia/cog deficits    Acute encephalopathy 2 sepsis    Endocarditis.      LORENZO    Back pain and L knee pain     PLAN:                   Based on patient's current functional status, pt would benefit from Acute Rehabilitation, wo

## 2019-12-04 PROBLEM — Z71.89 COUNSELING REGARDING ADVANCE CARE PLANNING AND GOALS OF CARE: Status: ACTIVE | Noted: 2019-01-01

## 2019-12-04 PROBLEM — Z51.5 PALLIATIVE CARE ENCOUNTER: Status: ACTIVE | Noted: 2019-01-01

## 2019-12-04 PROCEDURE — 99291 CRITICAL CARE FIRST HOUR: CPT | Performed by: INTERNAL MEDICINE

## 2019-12-04 NOTE — PLAN OF CARE
Pt alert, expressive aphasia, ESTRADA to command- LLE mildly weaker, passed swallow eval. Excruciating back pain, abnormal MRI, pain medication adjusted, pt needs encouragement to accept pain control.  Worked with PT/OT, able to stand/walk with min assist/walke

## 2019-12-04 NOTE — OCCUPATIONAL THERAPY NOTE
OCCUPATIONAL THERAPY TREATMENT NOTE - INPATIENT     Room Number: 6361/5935-H  Session: 1   Number of Visits to Meet Established Goals: 5    Presenting Problem: CVA    History related to current admission: Pt is 69year old male admitted on 11/30/2019 from significant minimal degenerative disc disease within the thoracic spine without significant central or foraminal stenosis.    3. L2-3 left posterior paracentral disc herniation with extruded disc material contacting but not displacing the left-sided nerve ASSESSMENT  Rating: Unable to rate(yelling out in pain)  Location: back pain  Management Techniques:  Activity promotion;Breathing techniques;Relaxation;Repositioning     ACTIVITY TOLERANCE                         O2 SATURATIONS                ACTIVITIES OF b/s chair, increased impulsivity and attempting to sit before chair behind him, with min assist and RW.  Patient performed oral hygiene seated in chair with max assist for visual attention to toothpaste, sequencing, and cognition (attempting to put cup/stra (home exercise program).     Additional Goals:  Pt will complete cognitive assessment  Pt will complete PHQ9

## 2019-12-04 NOTE — PROGRESS NOTES
[facility]  Progress Note     Graydennis Pointer Patient Status:  Inpatient    1945 MRN [id number]   [facility] 6NE-A Attending Wm Centeno MD   Hosp Day # 3 PCP No primary care provider on file.      Chief Complaint: [address] [place] [address] 23.0 21.0 23.0   ALKPHO 81 85  --  84  --    AST 26 24  --  38*  --    ALT 33 31  --  38  --    BILT 1.1 1.3  --  0.7  --    TP 7.1 6.9  --  7.4  --      Estimated Creatinine Clearance: 26.4 mL/min (A) (based on SCr of 2.82 mg/dL (H)).   Recent Labs   Lab 1 on hold   12. Coagulopathy d/t Coumadin reversed and stopped   13. Chronic kidney disease- stable   14. Leukocytosis d/t steroids  15. Depression-  16. History of VTE  17.  LORENZO- cr 2.8      PLAN  Cr 2.82  Stable  k 3.7  WBC 14.3 higher   Repeat blood cx P

## 2019-12-04 NOTE — PROGRESS NOTES
[facility]                INFECTIOUS DISEASE PROGRESS NOTE    Georganne Flow Patient Status:  Inpatient    1945 MRN [id number]   [facility] 6NE-A Attending Rogelio Giron MD   Hosp Day # 3 PCP No primary care provider on file Order Status: Completed Lab Status: Final result Updated: 12/03/19 0621   Specimen: Blood,peripheral     Blood Culture Result Enterococcus faecalisAbnormal     Blood Culture Smear Gram positive cocci in pairs and chainsAbnormal    Susceptibility      Enter Acute on chronic congestive heart failure, unspecified heart failure type (HCC)     Hypervolemia, unspecified hypervolemia type     Elevated troponin     Coronary artery disease involving coronary bypass graft of native heart with other forms of angina

## 2019-12-04 NOTE — PROGRESS NOTES
[facility]  Cardiology Progress Note    Subjective:  No chest pain or shortness of breath. Some word finding difficulty. Follows commands.      Objective:  BP (!) 139/115   Pulse 97   Temp 97.4 °F (36.3 °C) (Temporal)   Resp 13   Ht 6' 1\" (1.854 m) with hemorrhagic conversion. Neurology is following.    · Acute encephalopathy secondary to sepsis   · Ischemic cardiomyopathy EF 25-30%  · [employer] BiV ICD  · NSVT: several runs overnight and this AM  · Severe back/neck pain: MRI shows no septic emboli  · today    30 minutes of critical care time spent with patient including counseling family and coordinating care with ancillary staff.         Epi Willis MD  [facility]

## 2019-12-04 NOTE — CONSULTS
[address] [facility] Patient Status:  Inpatient    1945 MRN [id number]   [facility] 6NE-A Attending Baljinder Naranjo MD   Hosp Day # 3 PCP No primary care provider on file.      Date of Consul heart disease [facility])    • Coronary atherosclerosis    • Deep vein thrombosis (HCC)    • Depression    • Gout    • Heart attack ([facility] [address]    • Heart valve disease    • High blood pressure    • High cholesterol    • Osteoarthritis    • Pulmonary embolism (HCC) Sodium (OMNIPEN) 2 g in sodium chloride 0.9% 100 mL IVPB-minibag/add-vantage, 2 g, Intravenous, Q8H  •  cefTRIAXone Sodium (ROCEPHIN) 2 g in sodium chloride 0.9% 100 mL MBP/ADD-vantage, 2 g, Intravenous, Q12H  •  atorvastatin (LIPITOR) tab 80 mg, 80 mg, Or 12/04/2019     12/04/2019    K 3.7 12/04/2019     12/04/2019    CO2 23.0 12/04/2019     (H) 12/04/2019    CA 8.7 12/04/2019    ALB 2.0 (L) 12/03/2019    ALKPHO 84 12/03/2019    BILT 0.7 12/03/2019    TP 7.4 12/03/2019    AST 38 (H) 12/03 results were discussed with the patient's care nurse at 1600 hours on 12/2/2019. Critical results were read back.   Dictated by: Marimar Schmidt MD on 12/02/2019 at 15:56     Approved by: Marimar Schmidt MD on 12/02/2019 at 16:08          Mri Cervical+thora Occasional  Assist Normal or   Reduced Full or confused   50 Mainly sit/lie Extensive Disease  Can't do any work Partial Assist Normal or Reduced Full or confused   40 Mainly in bed Extensive Disease  Can't do any work Mainly Assist Normal or Reduced Full his medical care by exploring Nacho's understanding of his medical condition. Rafaela Mortensen did not recall what brought him to the hospital, nor was he able to tell me his recollection of any events during the hospitalization when asked initially.  He later He requested that I check back with him tomorrow as he suddenly felt \"wiped out\" and apologized for abbreviated discussion. I acknowledged his fatigue, and confirmed that he is receptive to future follow up from palliative re Libby [facility].  He stated, \"You're with being on a vent for short time during procedure, but she doesn't know where he stands with the current issue of pacer wire as she hasn't had opportunity to speak to him about it yet.  She states she would support him either way, (meaning pursue ongoing ([facility]     Acute renal failure superimposed on chronic kidney disease, unspecified CKD stage, unspecified acute renal failure type (HCC)     Acute hyperkalemia     LORENZO (acute kidney injury) ([facility]     CKD (chronic kidney disease) stage 3, GFR 30-59 ml/min (H discussions.        Palliative Care Follow Up:    A total of 50 minutes were spent on this consult, which included all of the following:  Direct face-to-face contact, history taking, physical examination, and > 50% was spent counseling and coordinating care

## 2019-12-04 NOTE — PROGRESS NOTES
[facility]                INFECTIOUS DISEASE PROGRESS NOTE    Joshua Godwin Patient Status:  Inpatient    1945 MRN [id number]   [facility] 6NE-A Attending Kory Hall MD   Hosp Day # 3 PCP No primary care provider on file Order Status: Completed Lab Status: Final result Updated: 12/03/19 0621   Specimen: Blood,peripheral     Blood Culture Result Enterococcus faecalisAbnormal     Blood Culture Smear Gram positive cocci in pairs and chainsAbnormal    Susceptibility      Enter Acute on chronic congestive heart failure, unspecified heart failure type (HCC)     Hypervolemia, unspecified hypervolemia type     Elevated troponin     Coronary artery disease involving coronary bypass graft of native heart with other forms of angina

## 2019-12-04 NOTE — PROGRESS NOTES
[facility]  Neurocritical Care       Subjective:  Izabella Caro is a(n) 69year old male s/p code stroek with word finding difficulty, CTA showed dominant right vertebral artery with open basilar and abnormal MTT on posterior fossa on per follows command with significant delay able to state his name  CrN: PERRLA +3 brisk. EOMI. VFF. Face is symmetrical. Tongue midline. Motor: No arm or leg weakness noted. Sensation: Respond to noxious stimuli in all 4  Gait not assessed.   Diagnostics: cyst.  MRI would better characterize this.   Preliminary report by vision radiologist.   Dictated by: Jean Paul Mike MD on 11/27/2019 at 6:20     Approved by: Jean Paul Mike MD on 11/27/2019 at 6:24          Ct Brain Or Head ([id number])    Result Date: 11/25/20 11/25/2019, 21:36. INDICATIONS:  weakness  TECHNIQUE:  Noncontrast CT scanning of the cervical spine is performed from the skull base through C7. Multiplanar reconstructions are generated. Dose reduction techniques were used.  Dose information is transmi Patient presents with bacteremia. History of kidney disease, valve replacement. FINDINGS:  This scan performed without additional IV IV or oral contrast, with limitations thereof.   The patient did have a CT with contrast earlier for brain imaging, and 11/30/2019  PROCEDURE:  XR CHEST AP PORTABLE  (CPT=71045)  TECHNIQUE:  AP chest radiograph was obtained. COMPARISON:  EDWARD , XR, XR CHEST AP PORTABLE  (CPT=71045), 11/25/2019, 22:05.   INDICATIONS:  back and leg pain, pt limited in answering questions  P [facility] , CT, CT BRAIN OR HEAD ([id number]), 11/27/2019, 1:04. INDICATIONS:  Stroke symptoms difficulty speaking  TECHNIQUE:  Noncontrast CT scanning is performed through the brain. Dose reduction techniques were used.  Dose information is transmitted to the [facility] [place]ck+perf(cpt=70496/69241/0042t)    Result Date: 12/1/2019  PROCEDURE:  CT STROKE(EDWIN BRAIN)CTA BRAIN/CTA NECK+PERF(CPT=70496/00317/0042T)  COMPARISON:  RONNY ROTH, CT STROKE BRAIN (NO IV)(CPT=70450), 12/01/2019, 10:25.   INDICATIONS:  Stroke symptoms dif side.  Only minimal atherosclerotic plaque in the left carotid bulb. The common carotid arteries are patent.    Distal to the severe stenosis on the right, the remainder of the right ICA, as well as the left ICA patent throughout to the terminus intracrani 3.53* 3.63* 3.77* 3.81   HGB 10.7* 10.6* 11.1* 11.4*   HCT 32.1* 33.2* 34.8* 35.8*   MCV 90.9 91.5 92.3 94.0   MCH 30.3 29.2 29.4 29.9   MCHC 33.3 31.9 31.9 31.8   RDW 18.0* 18.0* 18.1* 18.3*   NEPRELIM 14.13*  --  12.51* 12.92*   WBC 15.4* 13.7* 13.9* 14. Nightly  Fluticasone Propionate (FLONASE) 50 MCG/ACT nasal spray 2 spray, 2 spray, Each Nare, Daily  nitroGLYCERIN (NITROSTAT) SL tab 0.4 mg, 0.4 mg, Sublingual, Q5 Min PRN  Pantoprazole Sodium (PROTONIX) EC tab 20 mg, 20 mg, Oral, BID AC  PEG 3350 (MIRALA bioprosthesis that has mobile echodensity measuring at least 1 cm x 0.5 cm, suggest clinical correlation for endocarditis   - May not be a candidate for surgery as per   Pulmonary:  - RA or O2 via NC    Renal:  - BUN/Cr 75/2.82  Monitor I/O’s     Int

## 2019-12-04 NOTE — CM/SW NOTE
Met with patient and his friend at bedside. Patient agreeable to speaking in front of friend. Discussed PMR recommendation for acute rehab. AR list given to patient. Patient prefers [facility]  Both patient and friend are concerned with copays or  be

## 2019-12-04 NOTE — PLAN OF CARE
Care asumed @ 0730. Awakened easily to verbal stimuli, denies pain but c/o discomfort when abd palpated. Active bowel sounds, passing flatus, refused Metamucil. Remains with complicated rhythm of Afib, PVCs and paced beats, and short 3-5 beat runs.  Express

## 2019-12-04 NOTE — PHYSICAL THERAPY NOTE
PHYSICAL THERAPY TREATMENT NOTE - INPATIENT    Room Number: 3739/0757-W     Session: 1  Number of Visits to Meet Established Goals: 5    Presenting Problem: Neck Pain    History related to current admission: Pt is 69year old male admitted on 11/30/2019 f OBJECTIVE  Precautions: Bed/chair alarm(-160)    WEIGHT BEARING RESTRICTION  Weight Bearing Restriction: None                PAIN ASSESSMENT   Ratin  Location: back  Management Techniques:  Activity promotion;Repositioning(Patient was given m sitting pt required Max A. He had significant pain when performing this transition. Pt required Max A to perform sitting to standing. He was educated on how to utilize the rolling walker and required verbal cues on hand placement for his safety.  Pt was abl education; Neuromuscular re-educate;Strengthening;Stair training;Transfer training;Balance training  Rehab Potential : Good  Frequency (Obs): 5x/week    CURRENT GOALS  Goal #1 Patient is able to demonstrate supine - sit EOB @ level: supervision      Goal #2

## 2019-12-04 NOTE — CONSULTS
Patient seen and examined by Dr. Claudeen Marker. All pertinent imaging, labs, history reviewed. Case discussed with Cardiology. Patient is not a surgical candidate.      Stiven Bejarano PA-C

## 2019-12-04 NOTE — PLAN OF CARE
All care explained as given, all questions and concerns addressed. Family at bedside. Plan of care for the night reviewed, patient and family verbalized understanding to all explanations.

## 2019-12-04 NOTE — SLP NOTE
SPEECH/LANGUAGE/COGNITIVE EVALUATION - INPATIENT    Admission Date: 11/30/2019  Evaluation Date: 12/04/19    Reason for Referral: R/O aspiration;Stroke protocol    ASSESSMENT & PLAN   ASSESSMENT & IMPRESSION  Pt seen this PM for communication evaluation.  P return to work. Pt is an excellent candidate for acute rehab because intensive daily speech therapy services are required. Pt is not safe or appropriate for less intensive level of care.      Pt seen for dysphagia follow up as well- per RN pt tolerating die diffusion-weighted imaging within the left frontal lobe. There is also a punctate focus of   restricted diffusion within the right caudate body.   There also small areas of punctate restricted diffusion in the bilateral cerebellar hemispheres, left occipit

## 2019-12-05 PROCEDURE — 99233 SBSQ HOSP IP/OBS HIGH 50: CPT | Performed by: INTERNAL MEDICINE

## 2019-12-05 NOTE — PROGRESS NOTES
Transferred to [address] [place] [address] Rd.  Oriented to room   Neuro assessment q4h- no acute changes   Expressive aphasia   IV antibiotics continued   Will continue to monitor

## 2019-12-05 NOTE — PLAN OF CARE
Assumed care at [address]. PRN [place] given for leg pain with relief. HR maintained < 100  Neuro checks q4h, see charting for full assessment. Plan: awaiting decision for lead extraction. PT rec acute rehab.      Problem: Patient/Family Goals  Goal: Patient/Fa Mobility  Goal: Achieve highest/safest level of mobility/gait  Description  Interventions:  - Assess patient's functional ability and stability  - Promote increasing activity/tolerance for mobility and gait  - Educate and engage patient/family in tolerated

## 2019-12-05 NOTE — PROGRESS NOTES
[facility]  Progress Note     Romeo Husbands Patient Status:  Inpatient    1945 MRN [id number]   [facility] 6NE-A Attending Deni Abernathy MD   Hosp Day # 4 PCP No primary care provider on file.      Chief Complaint: [address] [place] [address] 4.1 4.1 4.2 4.4 3.7 4.5    104 109 114* 109  --    CO2 23.0 23.0 23.0 21.0 23.0  --    ALKPHO 81 85  --  84  --   --    AST 26 24  --  38*  --   --    ALT 33 31  --  38  --   --    BILT 1.1 1.3  --  0.7  --   --    TP 7.1 6.9  --  7.4  --   --      E Compensated   5. CAD sp CABG  6. Essential hypertension- BB    7. Dyslipidemia statin   8. Ischemic cardiomyopathy, EF 25-30% sp ICD  9. Sp AVR/ MVR  10. NSVT- Toprol resume per cards, IV BB prn for elevated HR   11.  Atrial fibrillation  Off AC remains on

## 2019-12-05 NOTE — PROGRESS NOTES
12/05/19 0321   Clinical Encounter Type   Visited With Health care provider  (DASHA Keller )   Continue Visiting No   Referral From Nurse    responded to page from RN. Patient's HCPOA wanted to complete a financial durable POA.    informed

## 2019-12-05 NOTE — PROGRESS NOTES
[facility]                INFECTIOUS DISEASE PROGRESS NOTE    Eyad Brown Patient Status:  Inpatient    1945 MRN [id number]   [facility] 6NE-A Attending Ingrid Dixon MD   Hosp Day # 4 PCP No primary care provider on file Blood Culture FREQ X 2 [[id number]] (Abnormal)  Collected: 11/30/19 1431   Order Status: Completed Lab Status: Final result Updated: 12/03/19 0621   Specimen: Blood,peripheral     Blood Culture Result Enterococcus faecalisAbnormal     Blood Culture Smear Gr Acute on chronic congestive heart failure, unspecified heart failure type (HCC)     Hypervolemia, unspecified hypervolemia type     Elevated troponin     Coronary artery disease involving coronary bypass graft of native heart with other forms of angina

## 2019-12-05 NOTE — PROGRESS NOTES
[id number] [facility] Progress Note    Joshua Godwin Patient Status:  Inpatient    1945 MRN [id number]   [facility] 7NE-A Attending Kory Hall MD   Hosp Day # 4 PCP No primary care provider on file.          Subjective:  W • metoprolol succinate  50 mg Oral 2x Daily(Beta Blocker)   • aspirin  300 mg Rectal Daily    Or   • aspirin  325 mg Oral Daily   • ampicillin  2 g Intravenous Q8H   • cefTRIAXone  2 g Intravenous Q12H   • atorvastatin  80 mg Oral Nightly   • Senna  17. 2 Acute bacterial endocarditis     Palliative care encounter     Counseling regarding advance care planning and goals of care    Assessment/Plan:    Acute bilateral embolic infarcts with some hemorrhagic conversion suspect septic emboli   Enterococcal Septi 98 °F (36.7 °C) Oral 74 21 97 %   12/05/19 0500 124/71 97.9 °F (36.6 °C) Oral 81 15 94 %   12/05/19 0100 97/50 97.8 °F (36.6 °C) Oral 74 13 93 %   12/04/19 2100 100/64 97.6 °F (36.4 °C) Oral 66 18 95 %       Gen: well developed, well nourished, no acute di 114 04/24/2019     Lab Results   Component Value Date    HDL 17 (L) 12/04/2019    HDL 30 (L) 04/24/2019     Lab Results   Component Value Date    TRIG 93 12/04/2019    TRIG 105 04/24/2019     Lab Results   Component Value Date    LDL 50 12/04/2019    LDL 6

## 2019-12-05 NOTE — PROGRESS NOTES
[facility] & [address] Patient Status:  Inpatient    1945 MRN [id number]   [facility] 7NE-A Attending Gregory Macias MD   Hosp Day # 4 PCP No primary care provider on file.      Date of visit:   Intake Consciousness   100 Full Normal Full   Normal Full   90 Full No disease  Normal Full Normal Full   80 Full Some disease  Normal w/effort Full Normal or  Reduced Full   70 Reduced Some disease  Can't perform job Full Normal or   Reduced Full   60 Red thoughts on any possibility that he may need for long-term respiratory support, and thoughts on resuscitation (compressions - specifically). This conversation took place over a period of time, with Ana Paula Guevara requesting for repetition of statements.  He did a treatment. HCPOA: Document on file. Healthcare Agent Appointed: Yes  Healthcare Agent's Name: Diann Way Agent's Phone Number: [phone]    Psychosocial: Emotional support provided to Kike and by phone to [place].     Problem List: outcome of meeting with primary, cards, pt and POA this evening. Code status/POLST: FULL CODE. Brief exploration again today, Jerilyn Daughters expressed preference to [employer] life vs death. \" ? Full understanding.  Laure Soriano advises she will uphold Bill's expresse

## 2019-12-05 NOTE — PROGRESS NOTES
[facility]  Cardiology Progress Note    Ranjeet Das Patient Status:  Inpatient    1945 MRN [id number]   [facility] 7NE-A Attending Enrique Vargas MD   Hosp Day # 4 PCP No primary care provider on file.      Subjective:  No cardi docusate sodium  100 mg Oral BID   • finasteride  5 mg Oral Nightly   • Fluticasone Propionate  2 spray Each Nare Daily   • Pantoprazole Sodium  20 mg Oral BID AC   • PEG 3350  17 g Oral Daily   • Alfuzosin HCl ER  10 mg Oral Daily with breakfast   • lidoc reasonable if it will help clear bacteremia and decrease morbidity.    · I can't speak of surgical back up for the case, however I don't think the patient would survive a complication related to extraction and it would be unreasonable to expect the CV surge

## 2019-12-06 PROCEDURE — 99232 SBSQ HOSP IP/OBS MODERATE 35: CPT | Performed by: NURSE PRACTITIONER

## 2019-12-06 NOTE — PHYSICAL THERAPY NOTE
PHYSICAL THERAPY TREATMENT NOTE - INPATIENT    Room Number: 3232/5828-U     Session: 2  Number of Visits to Meet Established Goals: 5    Presenting Problem: Neck Pain    History related to current admission: Pt is 69year old male admitted on 11/30/2019 f OBJECTIVE  Precautions: Bed/chair alarm(-160)    WEIGHT BEARING RESTRICTION  Weight Bearing Restriction: None                PAIN ASSESSMENT   Rating: Unable to rate  Location: Back and left hip and left leg  Management Techniques: Repositioning to sit after multiple attempts to roll, max assist to roll to his side with use of bed rail. Mariela Hong assisted in dangle, assumed partial trunk in upright and immediately proceeded to lie down. Two times pt had same response.  Pt was able to bring adelaida le's up and Comments: Goals established on 12/3/2019, ongoing

## 2019-12-06 NOTE — PROGRESS NOTES
[id number] [facility] Neurology Progress Note    Lorraine Garcia Patient Status:  Inpatient    1945 MRN [id number]   [facility] 7NE-A Attending Sergio Hammer MD   Hosp Day # 5 PCP No primary care provider on file.      CC: Aphasia    Sub CO2 20.0 12/06/2019     12/06/2019    CA 8.7 12/06/2019    PGLU 138 12/06/2019       Diagnostics:  12/2/2019 MRI Cervical/Thoracic/Lumbar Spine  Multilevel degenerative disc disease of the cervical spine which is moderate causing mild to moderate bilateral cerebellar infarcts noted. There is lack of the normal flow void involving the right vertebral artery which was occluded on the recent CTA.      12/1/2019 CT Stroke EDWIN  Abnormal perfusion exam with particular demonstration of elevated mean tra aspirin  300 mg Rectal Daily    Or   • aspirin  325 mg Oral Daily   • ampicillin  2 g Intravenous Q8H   • cefTRIAXone  2 g Intravenous Q12H   • atorvastatin  80 mg Oral Nightly   • Senna  17.2 mg Oral Nightly   • allopurinol  100 mg Oral BID   • docusate s x10(3) uL    Basophil Absolute 0.03 0.00 - 0.20 x10(3) uL    Immature Granulocyte Absolute 0.10 0.00 - 1.00 x10(3) uL    Neutrophil % 87.7 %    Lymphocyte % 3.2 %    Monocyte % 6.1 %    Eosinophil % 1.8 %    Basophil % 0.3 %    Immature Granulocyte % 0.9 %

## 2019-12-06 NOTE — PLAN OF CARE
Assumed care at 1900  AOx4, expressive aphasia noted  L leg 2/5 - Dr. Hedy Schmidt aware  Decreased L leg sensation  RA, paced via tele  Voiding via urinal  Flexeril given for leg pain  IVF infusing  Care endorsed to Kamran Lloyd

## 2019-12-06 NOTE — PROGRESS NOTES
[facility]                INFECTIOUS DISEASE PROGRESS NOTE    Aaron Reis Patient Status:  Inpatient    1945 MRN [id number]   [facility] 6NE-A Attending Chrystal Oppenheim, MD   Hosp Day # 5 PCP No primary care provider on file TP 7.1 6.9  --  7.4  --   --   --     < > = values in this interval not displayed.        No results found for: [facility]  Microbiology  Blood Culture FREQ X 2 [[id number]] (Abnormal)  Collected: 11/30/19 1431   Order Status: Completed Lab Status: Final result U Metabolic acidosis     Angina of effort (HCC)     Atrial fibrillation (HCC)     Hyperglycemia     Acute on chronic congestive heart failure (HCC)     Acute on chronic congestive heart failure, unspecified heart failure type (HCC)     Hypervolemia, unspe

## 2019-12-06 NOTE — PROGRESS NOTES
[facility]  Cardiology Progress Note    Subjective:  No chest pain or shortness of breath.     Objective:  /69 (BP Location: Left arm)   Pulse 76   Temp 98.3 °F (36.8 °C) (Oral)   Resp 23   Ht 6' 1\" (1.854 m)   Wt 237 lb 7 oz (107.7 kg)   SpO2 9 emboli  · CAD: hx redo CABG  · HTN: controlled on meds  · HLD: on statin  · Hx AVR MVR  · Chronic renal failure. Cr 2.51  · PAF: <0.1% afib since september by device check.   · Hx PE/DVT    Plan:  · Plan for outpatient follow up regarding complicated lead e

## 2019-12-06 NOTE — PROGRESS NOTES
[facility]  Progress Note     Lennox Riggers Patient Status:  Inpatient    1945 MRN [id number]   [facility] 6NE-A Attending Rachel Rocha MD   Hosp Day # 5 PCP No primary care provider on file.      Chief Complaint: [address] [place] [address] --    * 135*   < > 142 140 140 139   K 4.1 4.1   < > 4.4 3.7 4.5  4.5 4.1    104   < > 114* 109 109 111   CO2 23.0 23.0   < > 21.0 23.0 22.0 20.0*   ALKPHO 81 85  --  84  --   --   --    AST 26 24  --  38*  --   --   --    ALT 33 31  --  38  -- extraction at this time- per cards. Dr Vinny Mohr notified as needs PICC placement for abx   3. Back pain chronic   1. MRI spine   2. Analgesics as needed   4. Chronic systolic heart failure- Hold Lasix cards following   Compensated   5. CAD sp CABG  6.  Essent

## 2019-12-06 NOTE — PLAN OF CARE
Assumed care at 0730  VSS  Neuros Q4  Pt reports pain 6-9  PRN Norco given for pain   Metoprolol increased  Healthcare POA to meet with Cardiology for surgery decision  POC discussed with pt and family  Will continue to monitor     Documentation reviewed, quality is noted   Outcome: Progressing     Problem: Impaired Functional Mobility  Goal: Achieve highest/safest level of mobility/gait  Description  Interventions:  - Assess patient's functional ability and stability  - Promote increasing activity/toleranc

## 2019-12-06 NOTE — DIETARY NOTE
[facility]    NUTRITION ASSESSMENT    Pt does not meet malnutrition criteria.     NUTRITION DIAGNOSIS/PROBLEM:    Inadequate oral intake related to inability to consume sufficient intake as evidenced by NPO status - resolving, now on Cardiac Diet    NU discretion    MONITOR AND EVALUATE/NUTRITION GOALS:    1. PO to meet greater than 75% estimated needs    2. Greater than 75% intake of ONS    3. Maintain Lean Body mass    4.  Maintain Skin Integrity    MEDICATIONS:  Abx, Colace, Protonix, Senna,     LABS:

## 2019-12-06 NOTE — PLAN OF CARE
Received report at 0700. Patient alert and oriented x3. Complains of severe back and left leg pain at times, very painful to move. Flexeril and Norco given with some relief.  Patient only able to sit to side of the bed with therapy today, worse than earlier

## 2019-12-06 NOTE — PLAN OF CARE
Assumed care of pt at 0300. Pt c/o pain this am to right leg an given muscle relaxer. Pt with expressive and some receptive aphasia and gets quite upset at times when he cant get his point across. Neuro checks Q4h no change.  bp remains in the parameters se

## 2019-12-06 NOTE — PALLIATIVE CARE NOTE
The patient after discussing his options with Dr. Delia Pereira and Dr. Amberly Farrar per T[facility] Estes the [facility] wants to have the wires removed and this will be dependent on the timing per the MDs.  Moiz Barillas is also accepting of long term antibiotics and discharge plan t

## 2019-12-06 NOTE — CM/SW NOTE
MSW spoke with Josh Robles from [place] [id number]. They are able to accept the patient when he is medically cleared for dc.     Renea Goldberg LCSW

## 2019-12-07 PROCEDURE — 99232 SBSQ HOSP IP/OBS MODERATE 35: CPT | Performed by: INTERNAL MEDICINE

## 2019-12-07 NOTE — PLAN OF CARE
Problem: Patient/Family Goals  Goal: Patient/Family Long Term Goal  Description  Patient's Long Term Goal: Go home.     Interventions:  - Transfer to floor.  - See additional Care Plan goals for specific interventions   Outcome: Adequate for Discharge  Go and stability  - Promote increasing activity/tolerance for mobility and gait  - Educate and engage patient/family in tolerated activity level and precautions  - Recommend use of RW for transfers and ambulation   Outcome: Adequate for Discharge     Problem: Initiate measures to prevent increased intracranial pressure  - Maintain blood pressure and fluid volume within ordered parameters to optimize cerebral perfusion and minimize risk of hemorrhage  - Monitor temperature, glucose, and sodium.  Initiate appropri

## 2019-12-07 NOTE — PROGRESS NOTES
NURSING DISCHARGE NOTE    All consults signed off on patient. Discharge AVS printed and put in envelope for RN at [facility]. Scripts for antibiotics and pain medications signed and sent with discharge paperwork.  Midline placed for IV antibiotics per I

## 2019-12-07 NOTE — PROGRESS NOTES
12/07/19 1230   Midline Single Lumen 64/99/96 Left Basilic   Placement Date/Time: 12/07/19 1230   Reason for Central Line Insertion: Discharged with long term antibiotics; Vesicant/Irritant administration  Risk & benefits explained: PICC RN  [facility]

## 2019-12-07 NOTE — PROGRESS NOTES
[id number] [facility] Progress Note    Lucien Chadwick Patient Status:  Inpatient    1945 MRN [id number]   [facility] 7NE-A Attending Carlton Molina MD   Hosp Day # 6 PCP No primary care provider on file. Subjective:   Juda Scheuermann K 4.0 12/07/2019     12/07/2019    CO2 20.0 12/07/2019     12/07/2019    CA 8.3 12/07/2019    PGLU 163 12/07/2019     Medications:  • metoprolol succinate  75 mg Oral 2x Daily(Beta Blocker)   • Heparin Sodium (Porcine)  5,000 Units Subcutan stroke education  Cardiology following, op follow up for complicated lead extraction, likely in 5-10 days  ID following, managing antibiotics, needs PICC for LT therapy    Discussed with Dr. Luz Lock. Trying to dc to rehab this weekend.  AC therapy has bee (from the past 24 hour(s))   POCT GLUCOSE    Collection Time: 12/06/19 12:24 PM   Result Value Ref Range    POC Glucose 140 (H) 70 - 99 mg/dL   POCT GLUCOSE    Collection Time: 12/06/19  9:44 PM   Result Value Ref Range    POC Glucose 139 (H) 70 - 99 mg/dL minutes total time   Available within moments call in hospital  PROCEDURE DONE    (   ) see notes

## 2019-12-07 NOTE — PHYSICAL THERAPY NOTE
PHYSICAL THERAPY TREATMENT NOTE - INPATIENT    Room Number: 8539/1829-V     Session: 3   Number of Visits to Meet Established Goals: 5    Presenting Problem: neck/back pain; bilateral embolic infarct    History related to current admission: Pt is 74 year OBJECTIVE  Precautions: Bed/chair alarm(-160)    WEIGHT BEARING RESTRICTION  Weight Bearing Restriction: None                PAIN ASSESSMENT   Rating: Unable to rate  Location: back; LLE  Management Techniques: Activity promotion; Body mechanics; to Stand: mod asst x 2 with rolling walker; moderate verbal cues for hand placement  Stand to sit: max asst x 2 with rolling walker; max verbal and tactile cues for hand placement; however, pt unable to demonstrate compliance prior to sitting - asst requir re-educate;Strengthening;Stair training;Transfer training;Balance training  Rehab Potential : Good  Frequency (Obs): 5x/week    CURRENT GOALS   Goal #1 Patient is able to demonstrate supine - sit EOB @ level: supervision      Goal #2 Patient is able to [facility]

## 2019-12-07 NOTE — PROGRESS NOTES
[facility]                INFECTIOUS DISEASE PROGRESS NOTE    Mario Johnson Patient Status:  Inpatient    1945 MRN [id number]   [facility] 6NE-A Attending Delroy Craft MD   Hosp Day # 6 PCP No primary care provider on file AST 26 24  --  38*  --   --   --   --    ALT 33 31  --  38  --   --   --   --    BILT 1.1 1.3  --  0.7  --   --   --   --    TP 7.1 6.9  --  7.4  --   --   --   --     < > = values in this interval not displayed.        No results found for: [employer] Quinupristin + Dalfopristin  Resistant    Vancomycin 2  Sensitive            Blood Culture Once [[id number]] Collected: 12/03/19 1030   Order Status: Completed Lab Status: Preliminary result Updated: 12/06/19 1100   Specimen: Blood,peripheral     Blood Cult Leukocytosis     Warfarin-induced coagulopathy (HCC)     Septicemia (HCC)     Acute CVA (cerebrovascular accident) [facility])     Acute bacterial endocarditis     Palliative care encounter     Counseling regarding advance care planning and goals of care

## 2019-12-07 NOTE — CM/SW NOTE
12/07/19 1100   Discharge disposition   Expected discharge disposition Short Term H   Name of Magretsusanbrittny [address]   Discharge transportation [employer]

## 2019-12-07 NOTE — CM/SW NOTE
10:51am  MSW called [facility] to f/u on pt's acceptance; spoke to Norah. MSW transferred MJ to Bedside Rn for info on patient clinically.     11:24am  MSW spoke to Norah, she hopes for patient to dc at 4pm. All documentation must be in before

## 2019-12-07 NOTE — PLAN OF CARE
Assumed patient care at 1900, patient has expressive aphasia and is frustrated  Patient Nq4, alert and oriented X4  Patient had back and leg pain, lidopatch applied to lower back  Norco and flexeril for pain  Patient voiding in urinal  Vss monitored on tel if coughing or persistent throat clearing or wet/gurgly vocal quality is noted   Outcome: Progressing     Problem: Impaired Functional Mobility  Goal: Achieve highest/safest level of mobility/gait  Description  Interventions:  - Assess patient's functional therapy as ordered  Outcome: Progressing     Problem: NEUROLOGICAL - ADULT  Goal: Achieves stable or improved neurological status  Description  INTERVENTIONS  - Assess for and report changes in neurological status  - Initiate measures to prevent increased

## 2019-12-08 NOTE — DISCHARGE SUMMARY
[facility]ITAL[facility]  DISCHARGE SUMMARY     Emma Price Patient Status:  Inpatient    1945 MRN [id number]   [facility] 7NE-A Attending Rufino Berkowitz MD   Hosp Day # 6 PCP No primary care provider on file.      Date of Admission:  protocol for discharge care transition. Lace+ Score: 80  59-90 High Risk  29-58 Medium Risk  0-28   Low Risk.      Risk of readmission: Sue Shah has High Risk of readmission after discharge from the hospital.    History of Present Illness: Sana Luis was not a surgical candidate. Cardiology, EP, and ID had further discussions and determined his prognosis is poor but may have benefit to extract ICD/leads.   Extensive discussion with patient, POA, and consults were had and agreed to have extraction in the details   furosemide 20 MG Tabs  Commonly known as:  LASIX  What changed:    · medication strength  · how much to take      Take 1 tablet (20 mg total) by mouth 2 (two) times daily.  Per pt, it depends on how I feel   Quantity:  30 tablet  Refills:  0     M for Chest pain. Refills:  0     Pantoprazole Sodium 20 MG Tbec  Commonly known as:  PROTONIX      Take 20 mg by mouth 2 (two) times daily before meals.    Refills:  0     Polyethylene Glycol 3350 Pack  Commonly known as:  MIRALAX      Take 17 g by mouth 2

## 2019-12-13 ENCOUNTER — TELEPHONE (OUTPATIENT)
Dept: CARDIOLOGY | Age: 74
End: 2019-12-13

## 2019-12-13 DIAGNOSIS — I50.20 SYSTOLIC HEART FAILURE, UNSPECIFIED HF CHRONICITY (CMD): Primary | ICD-10-CM

## 2019-12-13 DIAGNOSIS — Z95.810 ICD (IMPLANTABLE CARDIOVERTER-DEFIBRILLATOR) IN PLACE: ICD-10-CM

## 2019-12-27 ENCOUNTER — DOCUMENTATION (OUTPATIENT)
Dept: CARDIOLOGY | Age: 74
End: 2019-12-27

## 2019-12-27 RX ORDER — MIRTAZAPINE 15 MG/1
15 TABLET, ORALLY DISINTEGRATING ORAL NIGHTLY
COMMUNITY

## 2019-12-27 RX ORDER — FLUTICASONE PROPIONATE 50 MCG
2 SPRAY, SUSPENSION (ML) NASAL DAILY
COMMUNITY

## 2019-12-27 RX ORDER — GABAPENTIN 300 MG/1
300 CAPSULE ORAL 3 TIMES DAILY
COMMUNITY

## 2019-12-27 RX ORDER — ASPIRIN 325 MG
325 TABLET ORAL DAILY
COMMUNITY

## 2019-12-27 RX ORDER — ALLOPURINOL 100 MG/1
200 TABLET ORAL DAILY
COMMUNITY

## 2019-12-27 RX ORDER — FINASTERIDE 5 MG/1
5 TABLET, FILM COATED ORAL NIGHTLY
COMMUNITY

## 2019-12-27 RX ORDER — ATORVASTATIN CALCIUM 80 MG/1
80 TABLET, FILM COATED ORAL DAILY
COMMUNITY

## 2019-12-27 RX ORDER — METOPROLOL SUCCINATE 50 MG/1
75 TABLET, EXTENDED RELEASE ORAL 2 TIMES DAILY
COMMUNITY

## 2019-12-27 RX ORDER — FUROSEMIDE 20 MG/1
20 TABLET ORAL DAILY
COMMUNITY

## 2019-12-27 RX ORDER — HYDROCODONE BITARTRATE AND ACETAMINOPHEN 5; 325 MG/1; MG/1
1 TABLET ORAL EVERY 6 HOURS PRN
COMMUNITY

## 2019-12-27 RX ORDER — HYDRALAZINE HYDROCHLORIDE 25 MG/1
25 TABLET, FILM COATED ORAL 3 TIMES DAILY
COMMUNITY

## 2019-12-27 NOTE — HISTORICAL OFFICE NOTE
[facility]  Cardiology Progress Note           Hubbard Ismael Patient Status:  Inpatient    1945 MRN [id number]   [facility] 7NE-A Attending Rachel Umanzor MD   Hosp Day # 4 PCP No primary care provider on file.      Subjective: allopurinol  100 mg Oral BID   • docusate sodium  100 mg Oral BID   • finasteride  5 mg Oral Nightly   • Fluticasone Propionate  2 spray Each Nare Daily   • Pantoprazole Sodium  20 mg Oral BID AC   • PEG 3350  17 g Oral Daily   • Alfuzosin HCl ER  10 mg Or communication from [employer]). · Extraction would seem reasonable if it will help clear bacteremia and decrease morbidity.    · I can't speak of surgical back up for the case, however I don't think the patient would survive a complication related to extra

## 2019-12-30 PROCEDURE — 33241 REMOVE PULSE GENERATOR: CPT | Performed by: INTERNAL MEDICINE

## 2019-12-30 PROCEDURE — 33244 REMOVE ELCTRD TRANSVENOUSLY: CPT | Performed by: INTERNAL MEDICINE

## 2019-12-30 NOTE — PROCEDURES
PROCEDURE PERFORMED:  1.    RV and RA lead extraction. 2.    Device pocket revision with tissue debridement. ASSISTANT:  Zachery Salcedo M.D. COMPLICATIONS:  None.      ESTIMATED BLOOD LOSS:  Minimal.     BRIEF CLINICAL HISTORY:       METHODS:  The flakito

## 2019-12-30 NOTE — ANESTHESIA PREPROCEDURE EVALUATION
PRE-OP EVALUATION    Patient Name: Rut Callejas    Pre-op Diagnosis: Infected pacemaker leads      AICD lead and generator extraction    Pre-op vitals reviewed. There is no height or weight on file to calculate BMI. Current medications reviewed. Disp: 90 tablet, Rfl: 1  Cholecalciferol 2000 units Oral Cap, Take 2,000 Units by mouth daily. , Disp: , Rfl:   docusate sodium 100 MG Oral Cap, Take 100 mg by mouth 2 (two) times daily. , Disp: , Rfl:   atorvastatin 80 MG Oral Tab, Take 80 mg by mouth daily Value Date    WBC 11.1 (H) 12/06/2019    RBC 3.34 (L) 12/06/2019    HGB 9.9 (L) 12/06/2019    HCT 31.5 (L) 12/06/2019    MCV 94.3 12/06/2019    MCH 29.6 12/06/2019    MCHC 31.4 12/06/2019    RDW 18.3 (H) 12/06/2019    .0 12/06/2019     Lab Results

## 2019-12-30 NOTE — ANESTHESIA POSTPROCEDURE EVALUATION
Edilberto 22 Patient Status:  Outpatient in a Bed   Age/Gender 69year old male MRN [id number]   Location [address] [facility] Attending Arnoldo Beasley MD   Hosp Day # 0 PCP No primary care provider on file.        Faisal

## 2019-12-30 NOTE — ANESTHESIA PROCEDURE NOTES
Airway  Urgency: elective      General Information and Staff    Patient location during procedure: OR  Anesthesiologist: Durand Rubinstein, MD  Performed: anesthesiologist     Indications and Patient Condition  Indications for airway management: anesthesia

## 2019-12-30 NOTE — H&P
Nadia Moreno 63  H&P    Margarita Holstein Patient Status:  Outpatient in a Bed    1945 MRN [id number]   [facility] [address] [facility] Attending Danielle Ron MD   Hosp Day # 0 PCP No primary care provid 19 years ago. He has never used smokeless tobacco. He reports current alcohol use. He reports that he does not use drugs. Allergies:    Seafood                 ANAPHYLAXIS    Medications:  No current facility-administered medications for this encounter.

## 2019-12-30 NOTE — ANESTHESIA PROCEDURE NOTES
Arterial Line  Performed by: Mariam Fairchild MD  Authorized by: Mariam Fairchild MD     General Information and Staff    Procedure Start:  12/30/2019 1:12 PM  Procedure End:  12/30/2019 1:15 PM  Anesthesiologist:  Mariam Fairchild MD  Performed By:  Ewing Romberg

## 2019-12-30 NOTE — CONSULTS
[facility]                INFECTIOUS DISEASE PROGRESS NOTE    Verena Gomez Patient Status:  Inpatient    1945 MRN [id number]   [facility] 6NE-A Attending Rowdy Mccrary MD   Hosp Day # 0 PCP No primary care provider on file Order Status: Completed Lab Status: Final result Updated: 12/03/19 0644   Specimen: Blood,peripheral     Blood Culture Result Enterococcus faecalisAbnormal     Comment: For susceptibility results see previous culture.        Blood Culture Smear Gram positiv Coronary artery disease involving coronary bypass graft of native heart with other forms of angina pectoris (HCC)     ABDIRAHMAN on CPAP     Stage 4 chronic kidney disease ([facility] [address]     Biventricular ICD (implantable cardioverter-defibrillator) in place     NSVT (

## 2019-12-31 PROCEDURE — 99024 POSTOP FOLLOW-UP VISIT: CPT | Performed by: INTERNAL MEDICINE

## 2019-12-31 NOTE — CM/SW NOTE
Spoke to [facility], there are no beds available at their [address] [facility]) location. There are beds at the [facility] location. Patient agreeable to going to [facility] at this time. Possible dc today. Patient aware.  Patient is a total lift, Juany Guevara

## 2019-12-31 NOTE — PLAN OF CARE
Revisited the topic of going home with lifevest. Pt states he does not want a lifevest- states that if it is his time to go then it should be and I wont be able to convince him otherwise. Will respect patients decision- NO life vest upon dc.      Fritz Campbell

## 2019-12-31 NOTE — PLAN OF CARE
Received pt from cath lab s/p ICD and lead removal at 1530. Pt slightly drowsy but easily aroused. Pt has baseline expressive aphasia. Pupils equal and reactive. Follows commands. Equal strength. See flowsheet for documentation. VSS. Afib on monitor.  HR co

## 2019-12-31 NOTE — CM/SW NOTE
MSW left a message for Raghav Vega from [place] inquiring about discharge today to [facility]. Awaiting response.     Lynsey Ac LCSW

## 2019-12-31 NOTE — PROGRESS NOTES
[facility]                INFECTIOUS DISEASE PROGRESS NOTE    Kiana Kim Patient Status:  Inpatient    1945 MRN [id number]   [facility] 6NE-A Attending Anthony Núñez MD   Hosp Day # 0 PCP No primary care provider on file Blood Culture FREQ X 2 [[id number]] (Abnormal) Collected: 11/30/19 1432   Order Status: Completed Lab Status: Final result Updated: 12/03/19 0644   Specimen: Blood,peripheral     Blood Culture Result Enterococcus faecalisAbnormal     Comment: For susceptibi Hypervolemia, unspecified hypervolemia type     Elevated troponin     Coronary artery disease involving coronary bypass graft of native heart with other forms of angina pectoris (HCC)     ABDIRAHMAN on CPAP     Stage 4 chronic kidney disease ([facility] [address]     Desmond [facility]

## 2019-12-31 NOTE — PLAN OF CARE
VSS overnight, 60s-70s HR Afib. C/o minimal pain. Plan is to have midline removed and PICC placed and discharge to nursing home. Plan discussed with patient, questions answered. Will continue to monitor.

## 2019-12-31 NOTE — VASCULAR ACCESS
Vascular access consult ordered by Dr Dave Mcmahon for PICC line placement via Over wire exchange with Midline to Left Basilic vein.  After researching the Patient's history, the current Midline has been in place since 12/7/19 and the last dressing changed was do

## 2019-12-31 NOTE — CM/SW NOTE
Late Entry:     SW contacted by RN indicating pt's POA is bedside and is stating plan upon discharge is transfer to [facility]. Prior to admit pt was reportedly at [facility].    SW spoke with Chela Kirby, who is aware of referral

## 2019-12-31 NOTE — PROGRESS NOTES
Assumed care for this patient at 0730. Patient alert and oriented x4. Minimal complaints of pain to back, declining pain medication. Right PICC line placed.  Patient did not void since yesterday, bladder scan x2 done, 1st bladder scan 350cc 2nd bladder scan

## 2020-01-01 ENCOUNTER — APPOINTMENT (OUTPATIENT)
Dept: ULTRASOUND IMAGING | Facility: HOSPITAL | Age: 75
DRG: 260 | End: 2020-01-01
Attending: HOSPITALIST
Payer: MEDICARE

## 2020-01-01 ENCOUNTER — APPOINTMENT (OUTPATIENT)
Dept: GENERAL RADIOLOGY | Facility: HOSPITAL | Age: 75
End: 2020-01-01
Attending: EMERGENCY MEDICINE
Payer: MEDICARE

## 2020-01-01 ENCOUNTER — INITIAL APN SNF VISIT (OUTPATIENT)
Dept: INTERNAL MEDICINE CLINIC | Age: 75
End: 2020-01-01

## 2020-01-01 ENCOUNTER — EXTERNAL RECORD (OUTPATIENT)
Dept: HEALTH INFORMATION MANAGEMENT | Facility: OTHER | Age: 75
End: 2020-01-01

## 2020-01-01 ENCOUNTER — HOSPITAL ENCOUNTER (EMERGENCY)
Facility: HOSPITAL | Age: 75
Discharge: HOME | End: 2020-01-01
Attending: EMERGENCY MEDICINE
Payer: MEDICARE

## 2020-01-01 VITALS
SYSTOLIC BLOOD PRESSURE: 113 MMHG | TEMPERATURE: 98 F | DIASTOLIC BLOOD PRESSURE: 61 MMHG | WEIGHT: 228.19 LBS | RESPIRATION RATE: 18 BRPM | HEART RATE: 83 BPM | OXYGEN SATURATION: 94 % | BODY MASS INDEX: 30 KG/M2

## 2020-01-01 VITALS
RESPIRATION RATE: 18 BRPM | BODY MASS INDEX: 34 KG/M2 | SYSTOLIC BLOOD PRESSURE: 92 MMHG | WEIGHT: 259.25 LBS | OXYGEN SATURATION: 98 % | HEART RATE: 77 BPM | DIASTOLIC BLOOD PRESSURE: 56 MMHG | TEMPERATURE: 97 F

## 2020-01-01 VITALS
HEART RATE: 81 BPM | RESPIRATION RATE: 20 BRPM | DIASTOLIC BLOOD PRESSURE: 67 MMHG | TEMPERATURE: 98 F | SYSTOLIC BLOOD PRESSURE: 102 MMHG | OXYGEN SATURATION: 99 %

## 2020-01-01 DIAGNOSIS — I25.708 CORONARY ARTERY DISEASE INVOLVING CORONARY BYPASS GRAFT OF NATIVE HEART WITH OTHER FORMS OF ANGINA PECTORIS (HCC): ICD-10-CM

## 2020-01-01 DIAGNOSIS — S31.000D WOUND OF SACRAL REGION, SUBSEQUENT ENCOUNTER: ICD-10-CM

## 2020-01-01 DIAGNOSIS — A04.72 CLOSTRIDIUM DIFFICILE COLITIS: ICD-10-CM

## 2020-01-01 DIAGNOSIS — R78.81 BACTEREMIA DUE TO ENTEROCOCCUS: ICD-10-CM

## 2020-01-01 DIAGNOSIS — D64.9 ANEMIA, UNSPECIFIED TYPE: Primary | ICD-10-CM

## 2020-01-01 DIAGNOSIS — Z99.89 OSA ON CPAP: ICD-10-CM

## 2020-01-01 DIAGNOSIS — Z74.09 IMPAIRED MOBILITY AND ADLS: ICD-10-CM

## 2020-01-01 DIAGNOSIS — R33.9 URINARY RETENTION: ICD-10-CM

## 2020-01-01 DIAGNOSIS — N18.4 STAGE 4 CHRONIC KIDNEY DISEASE (HCC): ICD-10-CM

## 2020-01-01 DIAGNOSIS — I47.2 NSVT (NONSUSTAINED VENTRICULAR TACHYCARDIA) (HCC): ICD-10-CM

## 2020-01-01 DIAGNOSIS — R21 RASH OF ENTIRE BODY: ICD-10-CM

## 2020-01-01 DIAGNOSIS — F32.0 CURRENT MILD EPISODE OF MAJOR DEPRESSIVE DISORDER, UNSPECIFIED WHETHER RECURRENT (HCC): ICD-10-CM

## 2020-01-01 DIAGNOSIS — G47.33 OSA ON CPAP: ICD-10-CM

## 2020-01-01 DIAGNOSIS — I10 ESSENTIAL HYPERTENSION: ICD-10-CM

## 2020-01-01 DIAGNOSIS — T82.7XXD INFECTION OF IMPLANTABLE CARDIOVERTER-DEFIBRILLATOR (ICD) LEAD, SUBSEQUENT ENCOUNTER: ICD-10-CM

## 2020-01-01 DIAGNOSIS — A04.72 C. DIFFICILE COLITIS: ICD-10-CM

## 2020-01-01 DIAGNOSIS — I05.9 ENDOCARDITIS OF MITRAL VALVE: Primary | ICD-10-CM

## 2020-01-01 DIAGNOSIS — I63.40 CEREBROVASCULAR ACCIDENT (CVA) DUE TO EMBOLISM OF CEREBRAL ARTERY (HCC): ICD-10-CM

## 2020-01-01 DIAGNOSIS — B95.2 BACTEREMIA DUE TO ENTEROCOCCUS: ICD-10-CM

## 2020-01-01 DIAGNOSIS — I50.9 CHRONIC CONGESTIVE HEART FAILURE, UNSPECIFIED HEART FAILURE TYPE (HCC): ICD-10-CM

## 2020-01-01 DIAGNOSIS — I48.91 ATRIAL FIBRILLATION, UNSPECIFIED TYPE (HCC): ICD-10-CM

## 2020-01-01 DIAGNOSIS — Z78.9 IMPAIRED MOBILITY AND ADLS: ICD-10-CM

## 2020-01-01 DIAGNOSIS — M10.00 IDIOPATHIC GOUT, UNSPECIFIED CHRONICITY, UNSPECIFIED SITE: ICD-10-CM

## 2020-01-01 DIAGNOSIS — I80.8 THROMBOPHLEBITIS OF LEFT ARM: ICD-10-CM

## 2020-01-01 DIAGNOSIS — R53.1 WEAKNESS GENERALIZED: ICD-10-CM

## 2020-01-01 DIAGNOSIS — K62.5 BRIGHT RED BLOOD PER RECTUM: ICD-10-CM

## 2020-01-01 PROBLEM — T82.7XXA ICD (IMPLANTABLE CARDIOVERTER-DEFIBRILLATOR) INFECTION (HCC): Status: ACTIVE | Noted: 2020-01-01

## 2020-01-01 PROBLEM — I38 PROSTHETIC VALVE ENDOCARDITIS (HCC): Status: ACTIVE | Noted: 2020-01-01

## 2020-01-01 PROBLEM — T82.6XXA PROSTHETIC VALVE ENDOCARDITIS (HCC): Status: ACTIVE | Noted: 2020-01-01

## 2020-01-01 LAB
ALBUMIN SERPL-MCNC: 1.4 G/DL (ref 3.4–5)
ALBUMIN SERPL-MCNC: 1.5 G/DL (ref 3.4–5)
ALBUMIN/GLOB SERPL: 0.3 {RATIO} (ref 1–2)
ALBUMIN/GLOB SERPL: 0.3 {RATIO} (ref 1–2)
ALP LIVER SERPL-CCNC: 57 U/L (ref 45–117)
ALP LIVER SERPL-CCNC: 61 U/L (ref 45–117)
ALT SERPL-CCNC: <6 U/L (ref 16–61)
ALT SERPL-CCNC: <6 U/L (ref 16–61)
AMMONIA PLAS-MCNC: 12 UMOL/L (ref 11–32)
ANION GAP SERPL CALC-SCNC: 7 MMOL/L (ref 0–18)
ANION GAP SERPL CALC-SCNC: 8 MMOL/L (ref 0–18)
ANION GAP SERPL CALC-SCNC: 8 MMOL/L (ref 0–18)
APTT PPP: 38.3 SECONDS (ref 25.4–36.1)
AST SERPL-CCNC: 11 U/L (ref 15–37)
AST SERPL-CCNC: 12 U/L (ref 15–37)
ATRIAL RATE: 82 BPM
BASOPHILS # BLD AUTO: 0.03 X10(3) UL (ref 0–0.2)
BASOPHILS # BLD AUTO: 0.03 X10(3) UL (ref 0–0.2)
BASOPHILS NFR BLD AUTO: 0.2 %
BASOPHILS NFR BLD AUTO: 0.4 %
BILIRUB SERPL-MCNC: 0.3 MG/DL (ref 0.1–2)
BILIRUB SERPL-MCNC: 0.3 MG/DL (ref 0.1–2)
BILIRUB UR QL STRIP.AUTO: NEGATIVE
BUN BLD-MCNC: 39 MG/DL (ref 7–18)
BUN BLD-MCNC: 45 MG/DL (ref 7–18)
BUN BLD-MCNC: 49 MG/DL (ref 7–18)
BUN BLD-MCNC: 50 MG/DL (ref 7–18)
BUN BLD-MCNC: 51 MG/DL (ref 7–18)
BUN/CREAT SERPL: 12.1 (ref 10–20)
BUN/CREAT SERPL: 13.9 (ref 10–20)
BUN/CREAT SERPL: 14.1 (ref 10–20)
C DIFF TOX B STL QL: POSITIVE
CALCIUM BLD-MCNC: 8 MG/DL (ref 8.5–10.1)
CALCIUM BLD-MCNC: 8.1 MG/DL (ref 8.5–10.1)
CALCIUM BLD-MCNC: 8.1 MG/DL (ref 8.5–10.1)
CALCIUM BLD-MCNC: 8.2 MG/DL (ref 8.5–10.1)
CALCIUM BLD-MCNC: 8.2 MG/DL (ref 8.5–10.1)
CHLORIDE SERPL-SCNC: 110 MMOL/L (ref 98–112)
CHLORIDE SERPL-SCNC: 112 MMOL/L (ref 98–112)
CHLORIDE SERPL-SCNC: 114 MMOL/L (ref 98–112)
CO2 SERPL-SCNC: 21 MMOL/L (ref 21–32)
CO2 SERPL-SCNC: 22 MMOL/L (ref 21–32)
CO2 SERPL-SCNC: 23 MMOL/L (ref 21–32)
COLOR UR AUTO: YELLOW
CREAT BLD-MCNC: 3.06 MG/DL (ref 0.7–1.3)
CREAT BLD-MCNC: 3.23 MG/DL (ref 0.7–1.3)
CREAT BLD-MCNC: 3.23 MG/DL (ref 0.7–1.3)
CREAT BLD-MCNC: 3.27 MG/DL (ref 0.7–1.3)
CREAT BLD-MCNC: 3.55 MG/DL (ref 0.7–1.3)
DEPRECATED RDW RBC AUTO: 76.2 FL (ref 35.1–46.3)
DEPRECATED RDW RBC AUTO: 76.2 FL (ref 35.1–46.3)
EOSINOPHIL # BLD AUTO: 0.17 X10(3) UL (ref 0–0.7)
EOSINOPHIL # BLD AUTO: 0.18 X10(3) UL (ref 0–0.7)
EOSINOPHIL NFR BLD AUTO: 1.3 %
EOSINOPHIL NFR BLD AUTO: 2.3 %
ERYTHROCYTE [DISTWIDTH] IN BLOOD BY AUTOMATED COUNT: 20.7 % (ref 11–15)
ERYTHROCYTE [DISTWIDTH] IN BLOOD BY AUTOMATED COUNT: 20.8 % (ref 11–15)
GLOBULIN PLAS-MCNC: 5 G/DL (ref 2.8–4.4)
GLOBULIN PLAS-MCNC: 5.2 G/DL (ref 2.8–4.4)
GLUCOSE BLD-MCNC: 119 MG/DL (ref 70–99)
GLUCOSE BLD-MCNC: 158 MG/DL (ref 70–99)
GLUCOSE BLD-MCNC: 87 MG/DL (ref 70–99)
GLUCOSE BLD-MCNC: 88 MG/DL (ref 70–99)
GLUCOSE BLD-MCNC: 91 MG/DL (ref 70–99)
GLUCOSE BLD-MCNC: 94 MG/DL (ref 70–99)
GLUCOSE UR STRIP.AUTO-MCNC: NEGATIVE MG/DL
HAV IGM SER QL: 1.7 MG/DL (ref 1.6–2.6)
HAV IGM SER QL: 1.8 MG/DL (ref 1.6–2.6)
HCT VFR BLD AUTO: 27.8 % (ref 39–53)
HCT VFR BLD AUTO: 27.9 % (ref 39–53)
HGB BLD-MCNC: 8.4 G/DL (ref 13–17.5)
HGB BLD-MCNC: 8.5 G/DL (ref 13–17.5)
IMM GRANULOCYTES # BLD AUTO: 0.03 X10(3) UL (ref 0–1)
IMM GRANULOCYTES # BLD AUTO: 0.05 X10(3) UL (ref 0–1)
IMM GRANULOCYTES NFR BLD: 0.4 %
IMM GRANULOCYTES NFR BLD: 0.4 %
INR BLD: 1.45 (ref 0.9–1.1)
LEUKOCYTE ESTERASE UR QL STRIP.AUTO: NEGATIVE
LYMPHOCYTES # BLD AUTO: 0.39 X10(3) UL (ref 1–4)
LYMPHOCYTES # BLD AUTO: 0.54 X10(3) UL (ref 1–4)
LYMPHOCYTES NFR BLD AUTO: 2.9 %
LYMPHOCYTES NFR BLD AUTO: 6.8 %
M PROTEIN MFR SERPL ELPH: 6.5 G/DL (ref 6.4–8.2)
M PROTEIN MFR SERPL ELPH: 6.6 G/DL (ref 6.4–8.2)
MCH RBC QN AUTO: 30.8 PG (ref 26–34)
MCH RBC QN AUTO: 30.8 PG (ref 26–34)
MCHC RBC AUTO-ENTMCNC: 30.2 G/DL (ref 31–37)
MCHC RBC AUTO-ENTMCNC: 30.5 G/DL (ref 31–37)
MCV RBC AUTO: 101.1 FL (ref 80–100)
MCV RBC AUTO: 101.8 FL (ref 80–100)
MONOCYTES # BLD AUTO: 0.37 X10(3) UL (ref 0.1–1)
MONOCYTES # BLD AUTO: 0.63 X10(3) UL (ref 0.1–1)
MONOCYTES NFR BLD AUTO: 4.6 %
MONOCYTES NFR BLD AUTO: 4.7 %
NEUTROPHILS # BLD AUTO: 12 X10 (3) UL (ref 1.5–7.7)
NEUTROPHILS # BLD AUTO: 12 X10(3) UL (ref 1.5–7.7)
NEUTROPHILS # BLD AUTO: 6.81 X10 (3) UL (ref 1.5–7.7)
NEUTROPHILS # BLD AUTO: 6.81 X10(3) UL (ref 1.5–7.7)
NEUTROPHILS NFR BLD AUTO: 85.5 %
NEUTROPHILS NFR BLD AUTO: 90.5 %
NITRITE UR QL STRIP.AUTO: NEGATIVE
OSMOLALITY SERPL CALC.SUM OF ELEC: 301 MOSM/KG (ref 275–295)
OSMOLALITY SERPL CALC.SUM OF ELEC: 305 MOSM/KG (ref 275–295)
OSMOLALITY SERPL CALC.SUM OF ELEC: 309 MOSM/KG (ref 275–295)
PH UR STRIP.AUTO: 5 [PH] (ref 4.5–8)
PLATELET # BLD AUTO: 133 10(3)UL (ref 150–450)
PLATELET # BLD AUTO: 163 10(3)UL (ref 150–450)
PLATELET MORPHOLOGY: NORMAL
POTASSIUM SERPL-SCNC: 3.8 MMOL/L (ref 3.5–5.1)
POTASSIUM SERPL-SCNC: 3.9 MMOL/L (ref 3.5–5.1)
POTASSIUM SERPL-SCNC: 3.9 MMOL/L (ref 3.5–5.1)
PROT UR STRIP.AUTO-MCNC: 30 MG/DL
PSA SERPL DL<=0.01 NG/ML-MCNC: 18.4 SECONDS (ref 12.5–14.7)
Q-T INTERVAL: 476 MS
QRS DURATION: 142 MS
QTC CALCULATION (BEZET): 531 MS
R AXIS: -14 DEGREES
RBC # BLD AUTO: 2.73 X10(6)UL (ref 3.8–5.8)
RBC # BLD AUTO: 2.76 X10(6)UL (ref 3.8–5.8)
SODIUM SERPL-SCNC: 140 MMOL/L (ref 136–145)
SODIUM SERPL-SCNC: 141 MMOL/L (ref 136–145)
SODIUM SERPL-SCNC: 142 MMOL/L (ref 136–145)
SODIUM SERPL-SCNC: 142 MMOL/L (ref 136–145)
SODIUM SERPL-SCNC: 143 MMOL/L (ref 136–145)
SP GR UR STRIP.AUTO: 1.02 (ref 1–1.03)
T AXIS: 155 DEGREES
UROBILINOGEN UR STRIP.AUTO-MCNC: <2 MG/DL
VENTRICULAR RATE: 75 BPM
WBC # BLD AUTO: 13.3 X10(3) UL (ref 4–11)
WBC # BLD AUTO: 8 X10(3) UL (ref 4–11)
WBC CLUMPS UR QL AUTO: PRESENT

## 2020-01-01 PROCEDURE — 36415 COLL VENOUS BLD VENIPUNCTURE: CPT

## 2020-01-01 PROCEDURE — 93005 ELECTROCARDIOGRAM TRACING: CPT

## 2020-01-01 PROCEDURE — 93971 EXTREMITY STUDY: CPT | Performed by: HOSPITALIST

## 2020-01-01 PROCEDURE — 99219 INITIAL OBSERVATION CARE,LEVL II: CPT | Performed by: HOSPITALIST

## 2020-01-01 PROCEDURE — 85730 THROMBOPLASTIN TIME PARTIAL: CPT | Performed by: EMERGENCY MEDICINE

## 2020-01-01 PROCEDURE — 99232 SBSQ HOSP IP/OBS MODERATE 35: CPT | Performed by: HOSPITALIST

## 2020-01-01 PROCEDURE — 99232 SBSQ HOSP IP/OBS MODERATE 35: CPT | Performed by: INTERNAL MEDICINE

## 2020-01-01 PROCEDURE — 99285 EMERGENCY DEPT VISIT HI MDM: CPT

## 2020-01-01 PROCEDURE — 99222 1ST HOSP IP/OBS MODERATE 55: CPT | Performed by: CLINICAL NURSE SPECIALIST

## 2020-01-01 PROCEDURE — 99310 SBSQ NF CARE HIGH MDM 45: CPT | Performed by: NURSE PRACTITIONER

## 2020-01-01 PROCEDURE — 99284 EMERGENCY DEPT VISIT MOD MDM: CPT

## 2020-01-01 PROCEDURE — 85025 COMPLETE CBC W/AUTO DIFF WBC: CPT | Performed by: EMERGENCY MEDICINE

## 2020-01-01 PROCEDURE — 82140 ASSAY OF AMMONIA: CPT | Performed by: EMERGENCY MEDICINE

## 2020-01-01 PROCEDURE — 93010 ELECTROCARDIOGRAM REPORT: CPT

## 2020-01-01 PROCEDURE — 82272 OCCULT BLD FECES 1-3 TESTS: CPT

## 2020-01-01 PROCEDURE — 80053 COMPREHEN METABOLIC PANEL: CPT | Performed by: EMERGENCY MEDICINE

## 2020-01-01 PROCEDURE — 85610 PROTHROMBIN TIME: CPT | Performed by: EMERGENCY MEDICINE

## 2020-01-01 PROCEDURE — 71045 X-RAY EXAM CHEST 1 VIEW: CPT | Performed by: EMERGENCY MEDICINE

## 2020-01-01 PROCEDURE — 99225 SUBSEQUENT OBSERVATION CARE: CPT | Performed by: HOSPITALIST

## 2020-01-01 RX ORDER — ACETAMINOPHEN 325 MG/1
650 TABLET ORAL EVERY 6 HOURS PRN
Status: DISCONTINUED | OUTPATIENT
Start: 2020-01-01 | End: 2020-01-01

## 2020-01-01 RX ORDER — HEPARIN SODIUM 5000 [USP'U]/ML
5000 INJECTION, SOLUTION INTRAVENOUS; SUBCUTANEOUS EVERY 8 HOURS SCHEDULED
Qty: 30 SYRINGE | Refills: 0 | Status: SHIPPED | OUTPATIENT
Start: 2020-01-01

## 2020-01-01 RX ORDER — DIPHENHYDRAMINE HYDROCHLORIDE, ZINC ACETATE 2; .1 G/100G; G/100G
1 CREAM TOPICAL 2 TIMES DAILY PRN
COMMUNITY

## 2020-01-01 RX ORDER — FUROSEMIDE 10 MG/ML
40 INJECTION INTRAMUSCULAR; INTRAVENOUS ONCE
Status: COMPLETED | OUTPATIENT
Start: 2020-01-01 | End: 2020-01-01

## 2020-01-01 RX ORDER — TRAMADOL HYDROCHLORIDE 50 MG/1
50 TABLET ORAL EVERY 12 HOURS PRN
Qty: 15 TABLET | Refills: 0 | Status: SHIPPED | OUTPATIENT
Start: 2020-01-01

## 2020-01-01 RX ORDER — HEPARIN SODIUM 5000 [USP'U]/ML
5000 INJECTION, SOLUTION INTRAVENOUS; SUBCUTANEOUS EVERY 8 HOURS SCHEDULED
Status: DISCONTINUED | OUTPATIENT
Start: 2020-01-01 | End: 2020-01-01

## 2020-01-01 RX ORDER — TRAMADOL HYDROCHLORIDE 50 MG/1
50 TABLET ORAL EVERY 12 HOURS PRN
Status: DISCONTINUED | OUTPATIENT
Start: 2020-01-01 | End: 2020-01-01

## 2020-01-01 RX ORDER — METOPROLOL SUCCINATE 50 MG/1
50 TABLET, EXTENDED RELEASE ORAL
Status: DISCONTINUED | OUTPATIENT
Start: 2020-01-01 | End: 2020-01-01

## 2020-01-01 RX ORDER — METOPROLOL SUCCINATE 50 MG/1
50 TABLET, EXTENDED RELEASE ORAL
Qty: 60 TABLET | Refills: 0 | Status: SHIPPED | COMMUNITY
Start: 2020-01-01

## 2020-01-01 RX ADMIN — METOPROLOL SUCCINATE 50 MG: 50 TABLET, EXTENDED RELEASE ORAL at 17:28:00

## 2020-01-01 RX ADMIN — FUROSEMIDE 40 MG: 10 INJECTION INTRAMUSCULAR; INTRAVENOUS at 12:11:00

## 2020-01-01 RX ADMIN — GABAPENTIN 300 MG: 300 CAPSULE ORAL at 09:17:00

## 2020-01-01 RX ADMIN — FINASTERIDE 5 MG: 5 TABLET, FILM COATED ORAL at 20:37:00

## 2020-01-01 RX ADMIN — ISOSORBIDE MONONITRATE 60 MG: 60 TABLET, EXTENDED RELEASE ORAL at 09:17:00

## 2020-01-01 RX ADMIN — FUROSEMIDE 40 MG: 10 INJECTION INTRAMUSCULAR; INTRAVENOUS at 16:47:00

## 2020-01-01 RX ADMIN — GABAPENTIN 300 MG: 300 CAPSULE ORAL at 08:16:00

## 2020-01-01 RX ADMIN — ALLOPURINOL 200 MG: 100 TABLET ORAL at 08:56:00

## 2020-01-01 RX ADMIN — ALFUZOSIN HYDROCHLORIDE 10 MG: 10 TABLET, EXTENDED RELEASE ORAL at 08:54:00

## 2020-01-01 RX ADMIN — ISOSORBIDE MONONITRATE 60 MG: 60 TABLET, EXTENDED RELEASE ORAL at 08:56:00

## 2020-01-01 RX ADMIN — ACETAMINOPHEN 650 MG: 325 TABLET ORAL at 12:12:00

## 2020-01-01 RX ADMIN — GABAPENTIN 300 MG: 300 CAPSULE ORAL at 20:37:00

## 2020-01-01 RX ADMIN — ACETAMINOPHEN 650 MG: 325 TABLET ORAL at 09:00:00

## 2020-01-01 RX ADMIN — ALFUZOSIN HYDROCHLORIDE 10 MG: 10 TABLET, EXTENDED RELEASE ORAL at 08:16:00

## 2020-01-01 RX ADMIN — GABAPENTIN 300 MG: 300 CAPSULE ORAL at 16:33:00

## 2020-01-01 RX ADMIN — GABAPENTIN 300 MG: 300 CAPSULE ORAL at 16:48:00

## 2020-01-01 RX ADMIN — ALLOPURINOL 200 MG: 100 TABLET ORAL at 08:16:00

## 2020-01-01 RX ADMIN — GABAPENTIN 300 MG: 300 CAPSULE ORAL at 21:16:00

## 2020-01-01 RX ADMIN — GABAPENTIN 300 MG: 300 CAPSULE ORAL at 21:46:00

## 2020-01-01 RX ADMIN — ISOSORBIDE MONONITRATE 60 MG: 60 TABLET, EXTENDED RELEASE ORAL at 08:54:00

## 2020-01-01 RX ADMIN — ALLOPURINOL 200 MG: 100 TABLET ORAL at 09:17:00

## 2020-01-01 RX ADMIN — FINASTERIDE 5 MG: 5 TABLET, FILM COATED ORAL at 21:46:00

## 2020-01-01 RX ADMIN — METOPROLOL SUCCINATE 50 MG: 50 TABLET, EXTENDED RELEASE ORAL at 05:12:00

## 2020-01-01 RX ADMIN — FINASTERIDE 5 MG: 5 TABLET, FILM COATED ORAL at 21:16:00

## 2020-01-01 RX ADMIN — ISOSORBIDE MONONITRATE 60 MG: 60 TABLET, EXTENDED RELEASE ORAL at 08:16:00

## 2020-01-01 RX ADMIN — GABAPENTIN 300 MG: 300 CAPSULE ORAL at 16:27:00

## 2020-01-01 RX ADMIN — ALLOPURINOL 200 MG: 100 TABLET ORAL at 08:54:00

## 2020-01-01 RX ADMIN — GABAPENTIN 300 MG: 300 CAPSULE ORAL at 08:56:00

## 2020-01-01 RX ADMIN — METOPROLOL SUCCINATE 50 MG: 50 TABLET, EXTENDED RELEASE ORAL at 06:13:00

## 2020-01-01 RX ADMIN — GABAPENTIN 300 MG: 300 CAPSULE ORAL at 08:54:00

## 2020-01-01 RX ADMIN — GABAPENTIN 300 MG: 300 CAPSULE ORAL at 16:50:00

## 2020-01-01 RX ADMIN — METOPROLOL SUCCINATE 50 MG: 50 TABLET, EXTENDED RELEASE ORAL at 16:48:00

## 2020-01-01 RX ADMIN — ALFUZOSIN HYDROCHLORIDE 10 MG: 10 TABLET, EXTENDED RELEASE ORAL at 09:17:00

## 2020-01-01 RX ADMIN — ALFUZOSIN HYDROCHLORIDE 10 MG: 10 TABLET, EXTENDED RELEASE ORAL at 08:56:00

## 2020-01-01 NOTE — PROGRESS NOTES
[facility]                INFECTIOUS DISEASE PROGRESS NOTE    Raheel Aly Patient Status:  Inpatient    1945 MRN [id number]   [facility] 6NE-A Attending Igor Bocanegra MD   Hosp Day # 0 PCP No primary care provider on file Culture Pending N/A         Problem list reviewed:  Patient Active Problem List:     Acute renal failure superimposed on chronic kidney disease ([facility] [address]     Acute renal failure superimposed on chronic kidney disease, unspecified CKD stage, unspecified acute r gent contraindicaed due to CRI)    Continue ivabx through 2/4  Repeat blood cx 2/18    2.  Urinary retention, zayas placed    Check labs today    Yao Han MD  [facility]  [phone]

## 2020-01-01 NOTE — CONSULTS
[facility]    Report of Consultation    Lennox Riggers Patient Status:  Outpatient in a Bed    1945 MRN [id number]   [facility] 6NE-A Attending Inessa De Los Santos MD   Hosp Day # 0 PCP No primary care provider on file.      Date of Ad finasteride (PROSCAR) tab 5 mg, 5 mg, Oral, Nightly  gabapentin (NEURONTIN) cap 300 mg, 300 mg, Oral, TID  Isosorbide Mononitrate ER (IMDUR) 24 hr tab 60 mg, 60 mg, Oral, Daily  Metoprolol Succinate ER (Toprol XL) 24 hr tab 75 mg, 75 mg, Oral, 2x Daily(Bet allopurinol 100 MG Oral Tab, Take 200 mg by mouth daily. Fluticasone Propionate 50 MCG/ACT Nasal Suspension, 2 sprays by Each Nare route daily. tamsulosin HCl 0.4 MG Oral Cap, Take 0.4 mg by mouth nightly.     Isosorbide Mononitrate ER 60 MG Oral Tablet AST 38 (H) 12/03/2019    ALT 38 12/03/2019    PTT 38.2 (H) 12/03/2019    INR 1.47 (H) 12/03/2019    PTP 18.6 (H) 12/03/2019    T4F 1.0 11/26/2019    TSH 0.610 11/26/2019    ESRML 66 (H) 12/01/2019    CRP 15.10 (H) 11/30/2019    MG 2.6 12/05/2019    PHOS 3

## 2020-01-01 NOTE — PROGRESS NOTES
[facility]  Progress Note    Zulma Trotter     Subjective:  No chest pain or shortness of breath. No palps.        Intake/Output:    Intake/Output Summary (Last 24 hours) at 1/1/2020 0932  Last data filed at 1/1/2020 0500  Gross per 24 hour   Intake 495 breakfast  lactated ringers infusion, , Intravenous, Continuous  cefTRIAXone Sodium (ROCEPHIN) 2 g in sodium chloride 0.9% 100 mL MBP/ADD-vantage, 2 g, Intravenous, Q24H        Assessment and Plan:  Principal Problem:    ICD (implantable cardioverter-defib

## 2020-01-01 NOTE — PLAN OF CARE
Patient in Afib HR 60-70s overnight with occasional SB episodes in the 40s. Frequent PVCs. SBP 90-100s. Urinary retention with bladder scans, last at 4am showing 240. Patient states he does not have the urge to pee. Hydralazine doses held d/t hypotension.  P

## 2020-01-01 NOTE — CONSULTS
[facility]  CONSULT     Deysi Heck Patient Status:  Outpatient in a Bed    1945 MRN [id number]   [facility] 6NE-A Attending Marlene Barba MD   Hosp Day # 0 PCP No primary care provider on file.      Reason for consult: m drugs. Family History: History reviewed. No pertinent family history. Allergies:   Seafood                 ANAPHYLAXIS    Medications:  No current facility-administered medications on file prior to encounter.    [START ON 1/4/2020] Heparin Sodium Henao 200 mg by mouth daily. , Disp: , Rfl:   Fluticasone Propionate 50 MCG/ACT Nasal Suspension, 2 sprays by Each Nare route daily. , Disp: , Rfl:   tamsulosin HCl 0.4 MG Oral Cap, Take 0.4 mg by mouth nightly.  , Disp: , Rfl:   Isosorbide Mononitrate ER 60 MG PLAN:     1. Sepsis/ bacteremia  1. Continue Abx per ID  2. 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 S/p ICD removal and lead extraction  2. Plan for Life vest on dc  3. Urinary retention  1. Ibarra placed  4.  Chronic systol

## 2020-01-01 NOTE — PROGRESS NOTES
S/p PO hydralazine patient with SBPs in the upper 80s. Brunildatabitha GOODMAN notified. Will continue to monitor and await it to wear off. Do not give further doses.

## 2020-01-01 NOTE — PLAN OF CARE
Assumed care for this patient at 0730. Patient alert but forgetful, intermittent expressive aphasia. Up to chair with sit to stand. Large loose incontinent stool, CDIFF sent, positive result notified to Dr. Maci Garcia, see orders.  Hospitalist and urology consu

## 2020-01-02 PROBLEM — Z95.810 ICD (IMPLANTABLE CARDIOVERTER-DEFIBRILLATOR) IN PLACE: Status: ACTIVE | Noted: 2020-01-01

## 2020-01-02 PROCEDURE — 99233 SBSQ HOSP IP/OBS HIGH 50: CPT | Performed by: INTERNAL MEDICINE

## 2020-01-02 NOTE — PROGRESS NOTES
[facility]                INFECTIOUS DISEASE PROGRESS NOTE    Nobie Brittle Patient Status:  Inpatient    1945 MRN [id number]   [facility] 6NE-A Attending Earnest Ramirez MD   Hosp Day # 0 PCP No primary care provider on file Pending N/A     Clostridium difficile(toxigenic)PCR Once [[id number]] (Abnormal) Collected: 01/01/20 1224   Order Status: Completed Lab Status: Final result Updated: 01/01/20 7785   Specimen: Stool     C.  Difficile Toxin B Gene Positive         Problem list Prosthetic MV endocarditis/ICD lead infection  With embolic CVA  -ischemic cardiomyopathy EF 25-30%, CRF    Blood cx positive 11/30, 12/1  -abx started 12/1  Repeat cx 12/3, 12/4 no growth to date    Readmitted sp ICD removal on 12/30    Ampicillin + ceftr

## 2020-01-02 NOTE — PLAN OF CARE
Patient ate very little of his dinner and continues to c/o rectal/anal pain. He verbalized that he does not want to get out of bed today. Several slow V-Tach beats on the monitor. Tylenol given one time for pain.   Problem: RISK FOR INFECTION - ADULT  Goal: discharge to nursing home, communication with social work and patient's family  - See additional Care Plan goals for specific interventions   1/2/2020 1756 by Kelsi Cronin, RN  Outcome: Progressing  1/2/2020 1453 by Kelsi Cronin, RN  Outcome:

## 2020-01-02 NOTE — PROGRESS NOTES
Palliative Care Consult request from Dr. Bayron Robertson noted requesting discussion for goals of care/assist with completion of POLST form. Discussed consultation request with patient's daughter Tino Lanier ([phone]) via phone today.  Family meeting planned for catia

## 2020-01-02 NOTE — PROGRESS NOTES
[facility]  Progress Note     Leontine Dee Patient Status:  Inpatient    1945 MRN [id number]   [facility] 6NE-A Attending Pearl Greenfield MD   Hosp Day # 0 PCP No primary care provider on file.      Chief Complaint: Medical ma Epic.    Medications:   • Metoprolol Succinate ER  50 mg Oral 2x Daily(Beta Blocker)   • vancomycin HCl  125 mg Oral QID   • allopurinol  200 mg Oral Daily   • finasteride  5 mg Oral Nightly   • gabapentin  300 mg Oral TID   • Isosorbide Mononitrate ER  60

## 2020-01-02 NOTE — PLAN OF CARE
Assumed care of pt at [address] [place] [address]. Pt resting in bed, VSS. Ibarra catheter in place. Incontinent stool occurrence. Albina care and CHG cloth bath given. Linens fully changed.  Hospitalist contacted to request flexi-seal to contain loose incontinent stools and prevent

## 2020-01-02 NOTE — CM/SW NOTE
Vidal cannot follow at [facility] for Palliative Care. Referral made to Family for PC- they will need to be notified of dc date.     Carol Day LCSW  /Discharge Planner  [phone]

## 2020-01-02 NOTE — PLAN OF CARE
Received patient this morning resting in bed and mourning and groaning complaining of pain on his buttocks. This RN tried to put [facility] up but patient verbalized that he could not tolerating it as it made his pain worse.  Rectal tube removed around 1445 to det

## 2020-01-02 NOTE — CM/SW NOTE
Discharge on hold for today. [facility] updated. Residential does not go to [facility] for Palliative Care. Pt's POA prefers [facility]. WIll ecin referral for [facility] to [facility]. Doug Crawford will be in tomorrow to complete POLST.   She requested PC consult h

## 2020-01-02 NOTE — PROGRESS NOTES
[facility]  Cardiology Critical Care Progress Note    Verena Gomez Patient Status:  Inpatient    1945 MRN [id number]   [facility] 6NE-A Attending Audrey Zarate MD   Hosp Day # 0 PCP No primary care provider on file.        Subj 01/01/2020    CREATSERUM 3.23 01/02/2020    BUN 45 01/02/2020     01/02/2020    K 3.9 01/02/2020     01/02/2020    CO2 23.0 01/02/2020    GLU 94 01/02/2020    CA 8.1 01/02/2020    ALB 1.5 01/01/2020    ALKPHO 61 01/01/2020    BILT 0.3 01/01/202 forgo a life vest and to be DNR. Dr Earline Hannah supports plan fully. · Will monitor another day. [id number] Mariana Ken for ENDOTRONIX, NP  1/2/2020  12:25 PM  [phone]    Patient seen and examined. Agree with above note and assessment.     Isa Rice MD  [facility]

## 2020-01-02 NOTE — CM/SW NOTE
SW spoke to pt's TERESA Bueno to inform her of plans to dc to [address] today. We also discussed POLST form. She requests pt to be DNR and also would like Palliative Care to follow him at [facility].  Offered choice for PC and she would like referral made t

## 2020-01-03 PROCEDURE — 99232 SBSQ HOSP IP/OBS MODERATE 35: CPT | Performed by: INTERNAL MEDICINE

## 2020-01-03 NOTE — CONSULTS
[facility]  Inpatient Wound Care Contact Note    Danika Linares Patient Status:  Inpatient    1945 MRN [id number]   [facility] 8NE-A Attending Radhika Jolley MD   Hosp Day # 1 PCP No primary care provider on file.      Received co

## 2020-01-03 NOTE — PHYSICAL THERAPY NOTE
Order received for PT eval. Attempted to see Pt this afternoon, however, Pt with doppler US L UE pending to r/o DVT. Will follow up as schedule allows.

## 2020-01-03 NOTE — CONSULTS
[address] [facility]  [id number]  Hospital Day #1  Date of Consult: 01/03/20  Patient seen at: [facility]    Reason for Consultation:      Consult requested by Dr. Basia Leal for evaluation of palliati the CHF, he was cleared by speech therapy. \"  Nausea: denies, appetite fair, per Berkley Bran only eats about 1/4 to 1/2 of meals, he doesn't like hospital food. \"  Pain: denies back or leg pain.  Stated he had abdominal pain at beginning of visit but then l Coags:  Lab Results   Component Value Date    INR 1.47 (H) 12/03/2019    PTT 38.2 (H) 12/03/2019       Chemistry:  Lab Results   Component Value Date    CREATSERUM 3.06 (H) 01/03/2020    BUN 49 (H) 01/03/2020     01/03/2020    K 3.8 01/03/2020 50 Mainly sit/lie Extensive Disease  Can't do any work   Partial Assist Normal or Reduced Full or confused   40 Mainly in bed Extensive Disease  Can't do any work Mainly Assist Normal or Reduced Full or confused   30 Bedbound Extensive Disease  Can't do antibiotics and additional rehab at the [facility] in hopes of him regaining some of his strength to mobilize with minimal assist. He stated he wants to \"be comfortable\".  Lamar Temple is realistic that he will likely never be able to be self sufficient enough to retu (chronic kidney disease) stage 3, GFR 30-59 ml/min (HCC)     Essential hypertension     Coagulopathy (HCC)     Metabolic acidosis     Angina of effort (HCC)     Atrial fibrillation (HCC)     Hyperglycemia     Acute on chronic congestive [facility] failure [facility] previously scanned to EMR, readily available by clicking on code status on EMR header. A total of 50 minutes were spent on this consult; >50% was spent counseling and coordinating care.   Thank you for allowing the Palliative Care Team to participate in

## 2020-01-03 NOTE — PROGRESS NOTES
[facility]  Progress Note     Sue Shah Patient Status:  Inpatient    1945 MRN [id number]   [facility] 6NE-A Attending Lazaro Villeda MD   Hosp Day # 1 PCP No primary care provider on file.      Chief Complaint: Medical ma results for input(s): TROP, CK in the last 168 hours. Imaging: Imaging data reviewed in Epic.     Medications:   • Metoprolol Succinate ER  50 mg Oral 2x Daily(Beta Blocker)   • vancomycin HCl  125 mg Oral QID   • allopurinol  200 mg Oral Daily   •

## 2020-01-03 NOTE — PLAN OF CARE
Received pt at 0700.  AOX2-not oriented to place, date, or situation, VSS on room air, NSR with PAC and PVCs on tele, L arm is swollen and weeping-doppler ordered, contact for Cdiff-having loose stools today, zayas in place, no complaints of pain, SCDs on, Interventions:   - Follow doctor's orders, take medications  -Plan for discharge to nursing home, communication with social work and patient's family  - See additional Care Plan goals for specific interventions   Outcome: Progressing     Problem: Nathalie Albert

## 2020-01-03 NOTE — CM/SW NOTE
Chart Review (9:40am):  Pt admitted from CNICU/ICU to 8th floor. Per chart referral was sent to [facility] they had no beds so HCPOA agreed for patient to go to [address] [place] [address]. PCS form for BLS on chart for Stan Brunson.   MSW left message for [facility]

## 2020-01-03 NOTE — PLAN OF CARE
Assumed care of Bill @ approximately 1915: sleeping but easily awakened, alert, confused to time/date/place, but cooperative with care; on room air; A-fib with junction/bradycardic periods on the monitor w/frequent multifocal PVCs; scrotal excoriation/mace interventions   Outcome: Progressing  Goal: Patient/Family Short Term Goal  Description  Patient's Short Term Goal: Patient would like to be discharged from the hospital     Interventions:   - Follow doctor's orders, take medications  -Plan for discharge t

## 2020-01-03 NOTE — PROGRESS NOTES
[facility]                INFECTIOUS DISEASE PROGRESS NOTE    Joshua Godwin Patient Status:  Inpatient    1945 MRN [id number]   [facility] 6NE-A Attending Kory Hall MD   Hosp Day # 1 PCP No primary care provider on file Status: None (Preliminary result)    Collection Time: 12/30/19  2:04 PM   Result Value Ref Range    Anaerobic Culture No Anaerobes to date N/A     Clostridium difficile(toxigenic)PCR Once [[id number]] (Abnormal) Collected: 01/01/20 1224   Order Status: Comp Alteration in self-care ability     Dehydration     Back pain     Chronic kidney disease     Leukocytosis     Warfarin-induced coagulopathy ([address]     Septicemia (HCC)     Acute CVA (cerebrovascular accident) ([facility] [address]     Acute bacterial endocarditis     Palli

## 2020-01-03 NOTE — PLAN OF CARE
1/3/2020    Assumed care of pt @ 0000. Pt drowsy but easily awakened. With some thought, pt alert to self, place and ytd. No other changes from day shift report. Orientated pt to room and call light. Pt received night dose of abx iv.  Pt is resting in bed wi

## 2020-01-03 NOTE — PROGRESS NOTES
[facility]  Cardiology Progress Note    Nehemiah Churdan Patient Status:  Inpatient    1945 MRN [id number]   [facility] 8NE-A Attending Betty Thomas MD   Hosp Day # 1 PCP No primary care provider on file. Subjective:   In be HCl  125 mg Oral QID   • allopurinol  200 mg Oral Daily   • finasteride  5 mg Oral Nightly   • gabapentin  300 mg Oral TID   • Isosorbide Mononitrate ER  60 mg Oral Daily   • ampicillin IVPB minibag/add-vantage 2g/100ml  2 g Intravenous Q8H   • Alfuzosin H

## 2020-01-04 NOTE — DISCHARGE PLANNING
Discharge     Patient cleared for discharge by all services. Was informed that SW discussed discharge with POA and time. Report given to Fred Way at [facility]. Telemetry discontinued. Transport via ambulance.   AVS, transfer report and facesheet wit

## 2020-01-04 NOTE — PROGRESS NOTES
UROLOGY PROGRESS NOTE    S/w nursing staff. Patient to d'c today to [facility]. Patient placed on palliative yesterday and family prefers to keep zayas catheter at this time due to other medical problems.    Okay from  standpoint to continue zayas catheter du

## 2020-01-04 NOTE — CM/SW NOTE
MSW called and spoke with Tom [facility] as this was specified as the patient's first choice of EB[facility]. Tom Sinclair stated that they do NOT have bed availability today. [facility]anne [facility] has a bed available and is willing to accept the patient today.  MS

## 2020-01-04 NOTE — PLAN OF CARE
Assumed care of pt at 1930 a/o x2 and at times yelling out for \"Rosalino\". He refused to eat dinner with staff help but could not feed himself. He did take his medications with frequent reminders about what they were for.   He was repositioned with pillow mcrae additional Care Plan goals for specific interventions   Outcome: Progressing  Goal: Patient/Family Short Term Goal  Description  Patient's Short Term Goal: to go to [facility]     Interventions:   - Follow doctor's orders  - take medications  - monitor tele  - co

## 2020-01-04 NOTE — PROGRESS NOTES
[facility]  Progress Note     Varsha Rebollar Patient Status:  Inpatient    1945 MRN [id number]   [facility] 6NE-A Attending Zaynab Messina MD   Hosp Day # 2 PCP No primary care provider on file.      Chief Complaint: Medical ma (A) (based on SCr of 3.27 mg/dL (H)). No results for input(s): PTP, INR in the last 168 hours. No results for input(s): TROP, CK in the last 168 hours. Imaging: Imaging data reviewed in Epic.     Medications:   • Metoprolol Succinate ER  50 mg

## 2020-01-04 NOTE — CM/SW NOTE
MSW spoke with the patient's [facility]. She interrupted MSW and said, \"When is he going? \"  MSW informed her that we could arrange transportation at 3:30 by ambulance.   She was agreeable and reluctant to discuss discharge planning any further about the

## 2020-01-04 NOTE — PLAN OF CARE
Aox1, disoriented to place, time, situation. VSS. Expiratory wheezes present and cough, strong. Adequate saturation on RA while awake and rest, requires 2L with exertion (turning) and with sleep.  Sinus arrhythmia on monitor, frequent PVC's, NSVT at times Goal  Description  Patient's Short Term Goal: Patient would like to be discharged from the hospital     Interventions:   - Follow doctor's orders, take medications  -Plan for discharge to nursing home, communication with social work and patient's family  -

## 2020-01-07 ENCOUNTER — APPOINTMENT (OUTPATIENT)
Dept: CARDIOLOGY | Age: 75
End: 2020-01-07

## 2020-01-07 NOTE — ED INITIAL ASSESSMENT (HPI)
Pt from [facility] with gi bleed, states black stool, on abx for cdiff, on o2, has zayas, cardiac hx

## 2020-01-07 NOTE — PROGRESS NOTES
Joe Baires  : 1945  Age 69year old  male patient is admitted to Facility: [address] [facility] [address] for  [facility] after MV endocarditis/ICD lead infection.     [address] [place] [address] Drive date:    19  Discharge date to [facility]:    20  ELOS:    NUBIA  Anticip GIB.    Past Medical History:   Diagnosis Date   • Atherosclerosis of coronary artery    • Congestive heart disease [facility])    • Coronary atherosclerosis    • Deep vein thrombosis (HCC)    • Depression    • Gout    • Heart attack ([facility] [address]    • Heart valve disea (four) hours as needed for Pain. • Cyclobenzaprine HCl 10 MG Oral Tab Take 1 tablet (10 mg total) by mouth 3 (three) times daily as needed for Muscle spasms.  30 tablet 0   • furosemide 20 MG Oral Tab Take 1 tablet (20 mg total) by mouth 2 (two) times d deficits  HENT: denies nasal congestion, sinus pain or sore throat; and hearing loss negative; +mild pharyngitis  RESPIRATORY: ---+wheezing and cough which is productive x one wk  CARDIOVASCULAR:denies chest pain, no palpitations , denies syncope, denies o auscultation; +wheezing, +wet cough; on room air  CARDIOVASCULAR: S1, S2 normal, RRR; no S3, no S4; , no click, no murmur  ABDOMEN:  normal active BS+, soft, nondistended; no organomegaly, no masses; no bruits; nontender, no guarding, no rebound tenderness ADLs  1. PT/OT eval and tx    LORENZO on CKD 4  1. Lasix held yesterday PM and this AM  2. Minimize nephrotoxic meds as able  3. CMP weekly    Embolic CVA w/ hemorrhagic conversion  1. PT/OT/ST eval and tx  2.   mg daily--ON HOLD    Sacral decubitus ulce ED, added Duonebs TID x 2 wks, Pulmicort nebs q 12 x 14 days; and prednisone burst of 40 mg daily x 5 days. Repeat CBC/CMP on 1.9.20.

## 2020-01-07 NOTE — ED NOTES
[facility] service ETA for pt to be transported back to [facility] approx 1600. EZIO Case notified via phone that pt will be returning to [facility].

## 2020-01-07 NOTE — ED PROVIDER NOTES
Patient Seen in: [facility] Emergency Department      History   Patient presents with:  GI Bleeding    Stated Complaint: gi bleed    HPI    Patient is a 61-year-old male here with concern for melena.   History is obtained with the assistance of the eran (Temporal)   Resp 16   SpO2 99%         Physical Exam      Constitutional: Awake, alert, age appearing, non-toxic  Head: Normocephalic and atraumatic. Eyes: EOM are normal. Pupils are equal, round, and reactive to light. Neck: Normal range of motion. following orders were created for panel order CBC WITH DIFFERENTIAL WITH PLATELET.   Procedure                               Abnormality         Status                     ---------                               -----------         ------

## 2020-01-14 NOTE — DISCHARGE SUMMARY
[facility]  Discharge Summary    Jolie Soliz Patient Status:  Inpatient    1945 MRN [id number]   [facility] 8NE-A Attending No att. providers found   Hosp Day # 2 PCP Cheryle Berber, MD         Admit date: 2019    Discharge cdiff  Urology-urinary retention  Hospitalist-medical management  Palliative Care-goals of care  Wound care-stage II pressure ulcer to sacrum    Procedures:   TF TAVR with   mm S3 valve on   / /18 with Dr.'s Ely/Andrew/Janusz      Disposition:  S tablet, R-0    furosemide 20 MG Oral Tab  Take 1 tablet (20 mg total) by mouth 2 (two) times daily.  Per pt, it depends on how I feel, Normal, Disp-30 tablet, R-0    lactulose 10 GM/15ML Oral Solution  Take 10 g by mouth daily as needed.  , Historical    mi

## 2020-01-18 ENCOUNTER — EXTERNAL FACILITY (OUTPATIENT)
Dept: FAMILY MEDICINE CLINIC | Facility: CLINIC | Age: 75
End: 2020-01-18

## 2020-01-18 DIAGNOSIS — I50.9 ACUTE ON CHRONIC CONGESTIVE HEART FAILURE, UNSPECIFIED HEART FAILURE TYPE (HCC): ICD-10-CM

## 2020-01-18 DIAGNOSIS — I25.708 CORONARY ARTERY DISEASE INVOLVING CORONARY BYPASS GRAFT OF NATIVE HEART WITH OTHER FORMS OF ANGINA PECTORIS (HCC): ICD-10-CM

## 2020-01-18 DIAGNOSIS — N17.9 ACUTE RENAL FAILURE SUPERIMPOSED ON CHRONIC KIDNEY DISEASE, UNSPECIFIED CKD STAGE, UNSPECIFIED ACUTE RENAL FAILURE TYPE (HCC): ICD-10-CM

## 2020-01-18 DIAGNOSIS — I48.0 PAF (PAROXYSMAL ATRIAL FIBRILLATION) (HCC): ICD-10-CM

## 2020-01-18 DIAGNOSIS — I38 ENDOCARDITIS OF PROSTHETIC VALVE, SUBSEQUENT ENCOUNTER: Primary | ICD-10-CM

## 2020-01-18 DIAGNOSIS — E86.0 DEHYDRATION: ICD-10-CM

## 2020-01-18 DIAGNOSIS — Z95.810 BIVENTRICULAR ICD (IMPLANTABLE CARDIOVERTER-DEFIBRILLATOR) IN PLACE: ICD-10-CM

## 2020-01-18 DIAGNOSIS — N18.9 CHRONIC KIDNEY DISEASE, UNSPECIFIED CKD STAGE: ICD-10-CM

## 2020-01-18 DIAGNOSIS — A04.72 C. DIFFICILE COLITIS: ICD-10-CM

## 2020-01-18 DIAGNOSIS — N18.9 ACUTE RENAL FAILURE SUPERIMPOSED ON CHRONIC KIDNEY DISEASE, UNSPECIFIED CKD STAGE, UNSPECIFIED ACUTE RENAL FAILURE TYPE (HCC): ICD-10-CM

## 2020-01-18 DIAGNOSIS — I50.23 ACUTE ON CHRONIC SYSTOLIC CONGESTIVE HEART FAILURE (HCC): ICD-10-CM

## 2020-01-18 DIAGNOSIS — I76 SEPTIC EMBOLISM (HCC): ICD-10-CM

## 2020-01-18 DIAGNOSIS — I48.91 ATRIAL FIBRILLATION, UNSPECIFIED TYPE (HCC): ICD-10-CM

## 2020-01-18 DIAGNOSIS — G47.33 OSA ON CPAP: ICD-10-CM

## 2020-01-18 DIAGNOSIS — Z95.810 ICD (IMPLANTABLE CARDIOVERTER-DEFIBRILLATOR) IN PLACE: ICD-10-CM

## 2020-01-18 DIAGNOSIS — D72.829 LEUKOCYTOSIS, UNSPECIFIED TYPE: ICD-10-CM

## 2020-01-18 DIAGNOSIS — N18.4 STAGE 4 CHRONIC KIDNEY DISEASE (HCC): ICD-10-CM

## 2020-01-18 DIAGNOSIS — I63.9 ACUTE CVA (CEREBROVASCULAR ACCIDENT) (HCC): ICD-10-CM

## 2020-01-18 DIAGNOSIS — I10 ESSENTIAL HYPERTENSION: ICD-10-CM

## 2020-01-18 DIAGNOSIS — N28.9 RENAL INSUFFICIENCY: ICD-10-CM

## 2020-01-18 DIAGNOSIS — Z99.89 OSA ON CPAP: ICD-10-CM

## 2020-01-18 DIAGNOSIS — E87.70 HYPERVOLEMIA, UNSPECIFIED HYPERVOLEMIA TYPE: ICD-10-CM

## 2020-01-18 DIAGNOSIS — T82.6XXD ENDOCARDITIS OF PROSTHETIC VALVE, SUBSEQUENT ENCOUNTER: Primary | ICD-10-CM

## 2020-01-18 DIAGNOSIS — R53.1 WEAKNESS GENERALIZED: ICD-10-CM

## 2020-01-18 DIAGNOSIS — M54.50 LOW BACK PAIN WITHOUT SCIATICA, UNSPECIFIED BACK PAIN LATERALITY, UNSPECIFIED CHRONICITY: ICD-10-CM

## 2020-01-18 DIAGNOSIS — T82.7XXA INFECTION INVOLVING IMPLANTABLE CARDIOVERTER-DEFIBRILLATOR (ICD), INITIAL ENCOUNTER (HCC): ICD-10-CM

## 2020-01-18 PROBLEM — M54.2 NECK PAIN: Status: RESOLVED | Noted: 2019-01-01 | Resolved: 2020-01-18

## 2020-01-18 PROBLEM — Z51.5 PALLIATIVE CARE ENCOUNTER: Status: RESOLVED | Noted: 2019-01-01 | Resolved: 2020-01-18

## 2020-01-18 PROBLEM — E87.5 ACUTE HYPERKALEMIA: Status: RESOLVED | Noted: 2019-01-01 | Resolved: 2020-01-18

## 2020-01-18 PROBLEM — Z71.89 COUNSELING REGARDING ADVANCE CARE PLANNING AND GOALS OF CARE: Status: RESOLVED | Noted: 2019-01-01 | Resolved: 2020-01-18

## 2020-01-18 PROCEDURE — 99306 1ST NF CARE HIGH MDM 50: CPT | Performed by: FAMILY MEDICINE

## 2020-01-18 NOTE — PROGRESS NOTES
[address] [facility] Author: Luma Campbell MD     1945 MRN [id number]   [facility]  Admission 20      Last Hospital Discharge 20 PCP Milo Hubbard MD   Hospital of Discharge 20       Date of Admission: 20 or headaches. Patient reports no visual disturbances and reports hearing has been about the same. Patient reports no recent injury or trauma. Allergies:  He is allergic to seafood.     Current Meds:  diphenhydrAMINE-zinc acetate 2-0.1 % Externa Oral Tab, Take 80 mg by mouth daily. finasteride 5 MG Oral Tab, Take 5 mg by mouth nightly. Pantoprazole Sodium 20 MG Oral Tab EC, Take 20 mg by mouth 2 (two) times daily before meals. allopurinol 100 MG Oral Tab, Take 200 mg by mouth daily.     Flut Nose normal.      Mouth/Throat:      Pharynx: No oropharyngeal exudate. Eyes:      General: No scleral icterus. Right eye: No discharge. Left eye: No discharge.       Conjunctiva/sclera: Conjunctivae normal.   Neck:      Musculoskeletal: No used to evaluate the upper extremity venous system. B-mode two-dimensional images of the vascular structures, Doppler spectral analysis, and color flow. Doppler imaging were performed.   The following veins were imaged:  Subclavian, Jugular, Axillary, Eb Domingo (cpt=71045)    Result Date: 12/31/2019  PROCEDURE:  XR CHEST AP PORTABLE  (CPT=71045)  TECHNIQUE:  AP chest radiograph was obtained. COMPARISON:  [facility] , XR, XR CHEST AP PORTABLE  (CPT=71045), 11/30/2019, 14:10.   [facility] , XR, XR CHEST AP PORTABLE  (CPT= chronic kidney disease (HCC)  ABDIRAHMAN on CPAP  Coronary artery disease involving coronary bypass graft of native heart with other forms of angina pectoris (HCC)  Hypervolemia, unspecified hypervolemia type  Acute on chronic congestive heart failure, unspecifie

## 2020-02-19 NOTE — PLAN OF CARE
See above note from [facility].   I spoke to [employer] rep:  the port on the LV lead is plugged and is MRI conditional.      Fausto Vang NP

## 2020-12-21 NOTE — PROGRESS NOTES
[facility]  [facility] Cardiology Progress Note    Verena Gomez Patient Status:  Inpatient    1945 MRN [id number]   [facility] 7NE-A Attending Rowdy Mccrary MD        Assessment & Plan:  79 year old male with PMHx sig for HFrEF (L 12/7/2019 0554  Gross per 24 hour   Intake 360 ml   Output 1050 ml   Net -690 ml       Physical Exam:  /81 (BP Location: Left arm)   Pulse 68   Temp 98.2 °F (36.8 °C) (Oral)   Resp 15   Ht 6' 1\" (1.854 m)   Wt 237 lb 7 oz (107.7 kg)   SpO2 94%   BMI No pertinent past medical history

## (undated) NOTE — IP AVS SNAPSHOT
Patient Demographics     Address  [address] [address] Phone  [phone] [facility]) *Preferred*  [phone] [facility])      Emergency Contact(s)     Name Relation Home Work Baltazar, [address]   [phone]    Dorota Ibarra Commonly known as:  LIPITOR      Take 80 mg by mouth daily. Cholecalciferol 50 MCG (2000 UT) Caps      Take 2,000 Units by mouth daily.           Cyclobenzaprine HCl 10 MG Tabs  Commonly known as:  cyclobenzaprine      Take 1 tablet (10 mg total) b Take 20 mg by mouth 2 (two) times daily before meals. Polyethylene Glycol 3350 Pack  Commonly known as:  MIRALAX      Take 17 g by mouth 2 (two) times daily.           sodium chloride 0.9% SOLN 100 mL with Ampicillin Sodium 2 g SOLR 2 g  Next dose IVPB-minibag/add-vantage 12/07/19 1126 New Bag      [id number] Cyclobenzaprine HCl (cyclobenzaprine) tab 10 mg 12/07/19 0007 Given      [id number] Cyclobenzaprine HCl (cyclobenzaprine) tab 10 mg 12/07/19 0752 Given      [id number] HYDROcodone-acetaminophen (N Order ID Medication Name Action Time Action Reason Comments    [id number] Heparin Sodium (Porcine) 5000 UNIT/ML injection 5,000 Units 12/07/19 0752 Given              Recent Vital Signs       Most Recent Value   Vitals  121/76 Filed at 12/07/2019 1300   Pu Blood Culture Result No Growth 4 Days    Blood Culture Once [[id number]] Collected:  12/05/19 0558    Order Status:  Completed Lab Status:  Preliminary result Updated:  12/07/19 0700    Specimen:  Blood,peripheral      Blood Culture Result No Growth 2 Day Order Status:  Completed Lab Status:  Final result Updated:  12/03/19 0644    Specimen:  Blood,peripheral      Blood Culture Result Enterococcus faecalis     Blood Culture Smear Gram positive cocci in pairs and chains    Blood Culture FREQ X 2 [[id number]] cardiomyopathy, EF 25-30% sp ICD, Essential hypertension, Dyslipidemia, Atrial fibrillation on Coumadin, Chronic kidney disease who presents with pain. Patient discharged a few days ago after being admitted for mild HF and MSK neck pain.  Patient was doing Medications:  No current facility-administered medications on file prior to encounter. hydrALAzine HCl 25 MG Oral Tab, Take 1 tablet (25 mg total) by mouth every 8 (eight) hours. , Disp: 90 tablet, Rfl: 1  atorvastatin 80 MG Oral Tab, Take 80 mg by mouth tamsulosin HCl 0.4 MG Oral Cap, Take 0.4 mg by mouth nightly.  , Disp: , Rfl:   Isosorbide Mononitrate ER 60 MG Oral Tablet 24 Hr, Take 60 mg by mouth daily. , Disp: , Rfl:         Review of Systems:   A comprehensive 14 point review of systems was complete 1334   PTP 37.1* 32.1* 24.0*   INR 3.44* 2.88* 2.01*       Recent Labs   Lab 11/25/19  2111 11/26/19  0614   TROP 0.102* 0.132*   CK 41  --        Imaging: Imaging data reviewed in Epic. ASSESSMENT / PLAN:     1. Back pain  1. Lido patch  2.  Tylenol a :  1945  Admit Date:  2019  Attending Provider:  Sergio Hammer MD                                  Primary Care Physician:  No primary care provider on file.    Admitting Diagnosis: Dehydration [E86.0]  Neck pain [M54.2]  Alteration in self involved, a central embolic source is suspected. Clinical correlation is recommended. The dominant areas of restricted diffusion in the frontal lobes demonstrate gradient susceptibility   suggesting associated petechial hemorrhage.   Continued CT follow-u was performed and there was no intracardiac shunting to suggest   an ASD or PFO. · There was moderate scattered atherosclerotic plaque in the   visualized aorta without evidence for mobile atheromata or   thrombus.   · No pericardial effusion    Seen by [facility] [COMPLETED] benzocaine (HURRICAINE/TOPEX) 20 % mouth spray 1 spray, 1 spray, Mouth/Throat, Once  morphINE sulfate (PF) 4 MG/ML injection 2 mg, 2 mg, Intravenous, Q2H PRN    Or  morphINE sulfate (PF) 4 MG/ML injection 4 mg, 4 mg, Intravenous, Q2H PRN  aspir nitroGLYCERIN (NITROSTAT) SL tab 0.4 mg, 0.4 mg, Sublingual, Q5 Min PRN  Pantoprazole Sodium (PROTONIX) EC tab 20 mg, 20 mg, Oral, BID AC  PEG 3350 (MIRALAX) powder packet 17 g, 17 g, Oral, Daily  Alfuzosin HCl ER (UROXATRAL) 24 hr tab 10 mg, 10 mg, Oral, Net 971 ml       Physical Exam:                                      General: Alert, cooperative, no distress, appears stated age. Head:  Normocephalic, without obvious abnormality, atraumatic. Eyes:  Conjunctivae/lids clear. PERRL, EOMs intact.  Vision Based on patient's current functional status, pt would benefit from Acute Rehabilitation, working with PT/OT/SLP to upgrade present functional status, provide family training, assess home equipment and assistive device needs.    Please consider aspir :  Destiny Schofield PT (Physical Therapist)        PHYSICAL THERAPY TREATMENT NOTE - INPATIENT    Room Number: 8412/6675-Z     Session: 3   Number of Visits to Meet Established Goals: 5    Presenting Problem: neck/back pain; bilateral embolic infar • VALVE REPAIR         SUBJECTIVE  Pt is agreeable to therapy.      OBJECTIVE  Precautions: Bed/chair alarm(-160)    WEIGHT BEARING RESTRICTION  Weight Bearing Restriction: None                PAIN ASSESSMENT   Rating: Unable to rate  Location: back; sequencing with log roll technique  Sit to supine: not assessed  Sit to Stand: mod asst x 2 with rolling walker; moderate verbal cues for hand placement  Stand to sit: max asst x 2 with rolling walker; max verbal and tactile cues for hand placement; olga PT Treatment Plan: Bed mobility; Endurance; Patient education;Gait training;Family education; Neuromuscular re-educate;Strengthening;Stair training;Transfer training;Balance training  Rehab Potential : Good  Frequency (Obs): 5x/week    CURRENT GOALS   Goal #1 infarctions. JASON positive for endocarditis.     Problem List[BR.1]  Principal Problem:    Septicemia ([facility] [address]  Active Problems:    Neck pain    Alteration in self-care ability    Dehydration    Back pain    Chronic kidney disease    Leukocytosis    Warfarin-i blankets)?: A Lot   -   Sitting down on and standing up from a chair with arms (e.g., wheelchair, bedside commode, etc.): Unable   -   Moving from lying on back to sitting on the side of the bed?: A Lot[BR.2]   How much help from another person does the pa Patient End of Session: In bed;Needs met;Call light within reach;RN aware of session/findings; All patient questions and concerns addressed[BR.2]    ASSESSMENT   Pt limited in mobility today due to poor activity tolerance and pain in left le, left hip, and Author:  RADHA Gallego Service:  Rehab Author Type:  SPEECH-LANGUAGE PATHOLOGIST    Filed:  12/4/2019  3:51 PM Date of Service:  12/4/2019  3:09 PM Status:  Signed    :  RADHA Gallego (SPEECH-LANGUAGE PATHOLOGIST)       SPEECH/LANGUAGE/COGNITI comprehension noted during assessment- further cognitive assessment is indicated at acute rehab as pt's overall comprehension level improves.     Given presentation, it appears pt would have difficulty effectively communicating in daily life with deficits i trials provided semantic cues as needed New Goal   Goal #3 Complete ADRS New Goal     Imaging Results: MRI 12/2/19: =====  CONCLUSION:       1.  There are scattered areas of restricted diffusion noted in the bilateral frontal lobes with the largest focal ar Attribution Key    LO. 1 - Tri Stark, SLP on 12/4/2019  3:09 PM  LO. 2 - RADHA Hinson on 12/4/2019  3:10 PM                     Immunizations     Name Date      Pneumovax 23 09/10/14       Multidisciplinary Problems     Active Goals        Problem: Pa

## (undated) NOTE — LETTER
[facility] [address], [address]  Consent for Procedure/Sedation    Date:     Time:       1.  I authorize the performance upon Emma Price the following:  Dual chamber internal cardiac defibrillator removal/Dual chamber lead extr to patient:    ________________________________    ___________________    Witness: _________________________      Date: ___________________    Printed: 2019   9:25 AM  Patient Name: Verena Gomez        : 1945       Medical Record #: [id number]

## (undated) NOTE — IP AVS SNAPSHOT
Patient Demographics     Address  [address] [address] Phone  [phone] [facility]) *Preferred*  [phone] [facility])      Emergency Contact(s)     Name Relation Home Work Baltazar, [address]   [phone]    [facility] Cholecalciferol 50 MCG (2000 UT) Caps  Next dose due:  TOMORROW January 1st      Take 2,000 Units by mouth daily.           cyclobenzaprine 10 MG Tabs  Commonly known as:  FLEXERIL      Take 1 tablet (10 mg total) by mouth 3 (three) times daily as needed fo Commonly known as:  REMERON  Next dose due: Tonight December 31st      Take 15 mg by mouth nightly. Pantoprazole Sodium 20 MG Tbec  Commonly known as:  PROTONIX  Next dose due:   Tonight December 31st      Take 20 mg by mouth 2 (two) times daily b [id number]-T - MAR ACTION REPORT  (last 24 hrs)    ** SITE UNKNOWN **     Order ID Medication Name Action Time Action Reason Comments    [id number] Alfuzosin HCl ER (UROXATRAL) 24 hr tab 10 mg 01/04/20 0917 Given      [id number] Ampicillin Sodium (OMNIP Temp  97.4 °F (36.3 °C) Filed at 01/04/2020 1229   SpO2  98 % Filed at 01/04/2020 1229      Patient's Most Recent Weight       Most Recent Value   Patient Weight  117.6 kg (259 lb 4.2 oz)         Lab Results Last 24 Hours      OPEN HEART SURGICAL PROFILE [ Specimen:  Other from Chest      Anaerobic Culture No Anaerobes to date    Urine Culture, Routine Once [[id number]] Collected:  01/01/20 1109    Order Status:  Completed Lab Status:  Final result Updated:  01/02/20 1354    Specimen:  Urine, clean catch Location [address] [facility] Attending Uriel De Jesus MD   Hosp Day # 0 PCP No primary care provider on file. Reason for Admission:  ICD removal with lead extraction. Assessment/Plan:  1.  Bacteremia and sepsis- enterococcus with Seafood                 ANAPHYLAXIS    Medications:  No current facility-administered medications for this encounter. Review of Systems:  All systems were reviewed and are negative except as described above in HPI.     Physical Exam:  There were no phoebe Location [address] [facility] Attending Yassine Rothman MD   Hosp Day # 0 PCP No primary care provider on file.      Reason for consult: medical mnagement    Requested by: Dr. Stewart Law    History of Present Illness: Romeo Husbands is a 69year old male with Medications:  No current facility-administered medications on file prior to encounter. [START ON 1/4/2020] Heparin Sodium Does not apply Powder, Inject 5,000 Units into the skin 3 (three) times daily. , Disp: , Rfl: 0  bisacodyl 10 MG Rectal Suppos, Place route daily. , Disp: , Rfl:   tamsulosin HCl 0.4 MG Oral Cap, Take 0.4 mg by mouth nightly.  , Disp: , Rfl:   Isosorbide Mononitrate ER 60 MG Oral Tablet 24 Hr, Take 60 mg by mouth daily. , Disp: , Rfl:   gabapentin 100 MG Oral Cap, Take 300 mg by mouth 3 (t 2. 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 S/p ICD removal and lead extraction  2. Plan for Life vest on dc  3. Urinary retention  1. Ibarra placed  4. Chronic systolic heart failure with a EF of 25 to 30%  5.  CAD status po No notes of this type exist for this encounter. Video Swallow Study Notes    No notes of this type exist for this encounter. SLP Notes    No notes of this type exist for this encounter.      Immunizations     Name Date      Pneumovax 23 09/10/14

## (undated) NOTE — IP AVS SNAPSHOT
[address]            (For Outpatient Use Only) Initial Admit Date: 11/30/2019   Inpt/Obs Admit Date: Inpt: 12/1/19 / Obs: 11/30/19   Discharge Date:    Tom Hernandez:  [de-identified]   MRN: [de-identified]   CSN: [id number]   CEID: [id number] Subscriber ID:  Pt Rel to Subscriber:    Hospital Account Financial Class: Medicare    December 7, 2019

## (undated) NOTE — Clinical Note
FYI,TCM call made, see notes. LATOSHA changed TCM HFU at [facility] to 9/5/19 as patient had a conflict with another appointment he has at [facility] on 9/4/19.

## (undated) NOTE — LETTER
[facility] [address], [address] [place] [address]    Consent for Anesthesia   1.   IEmma agree to be cared for by a physician anesthesiologist alone and/or with a nurse anesthetist, who is specially trained to monitor me and give me med allergic reactions to medications, injury to my airway, heart, lungs, vision, nerves, or muscles and in extremely rare instances death. 5. My doctor has explained to me other choices available to me for my care (alternatives).   6. Pregnant Patients (“epid Printed: 12/27/2019 at 9:24 AM    Medical Record #: [id number]                                            Page 1 of 1

## (undated) NOTE — LETTER
[address] [facility] [address], [address] [facility]     PICC LINE INSERTION CONSENT     I agree to have a Peripherally Inserted Central Catheter (PICC) placed in my arm.    1. The PICC insertion procedure, care, maintenance, risks, benefits, and c also discussed reasonable alternatives to the PICC, including risks, benefits, and side effects related to the alternatives and risks related to not receiving this procedure      _____________________ ___________ ____________________________________   Date

## (undated) NOTE — IP AVS SNAPSHOT
[address]            (For Outpatient Use Only) Initial Admit Date: 12/30/2019   Inpt/Obs Admit Date: Inpt: 1/2/20 / Obs: N/A   Discharge Date:    Abdiel Castaneda:  [de-identified]   MRN: [de-identified]   CSN: [id number]   CEID: [id number] Subscriber Name:  Heaven Renee :    Subscriber ID:  Pt Rel to Subscriber:    Hospital Account Financial Class: Medicare    2020

## (undated) NOTE — LETTER
Patient Name: Kathy Garcia        :        Medical Record #: [id number]    CONSENT FOR PROCEDURES/SEDATION    Date: 2019       Time: 10:55 AM        1.  I authorize the performance upon Kathy Garcia the following:  Dot Conception

## (undated) NOTE — ED AVS SNAPSHOT
Nehemiah Dixon   MRN: [id number]    Department:  [facility] Emergency Department   Date of Visit:  1/7/2020           Disclosure     Insurance plans vary and the physician(s) referred by the ER may not be covered by your plan.  Please contact your in tell this physician (or your personal doctor if your instructions are to return to your personal doctor) about any new or lasting problems. The primary care or specialist physician will see patients referred from the [facility] Emergency Department.  Shelton Lombard

## (undated) NOTE — ED AVS SNAPSHOT
Margarita Holstein   MRN: [id number]    Department:  [facility] Emergency Department   Date of Visit:  1/22/2019           Disclosure     Insurance plans vary and the physician(s) referred by the ER may not be covered by your plan.  Please contact your i tell this physician (or your personal doctor if your instructions are to return to your personal doctor) about any new or lasting problems. The primary care or specialist physician will see patients referred from the [facility] Emergency Department.  Guera Arellano